# Patient Record
Sex: MALE | Employment: FULL TIME | URBAN - METROPOLITAN AREA
[De-identification: names, ages, dates, MRNs, and addresses within clinical notes are randomized per-mention and may not be internally consistent; named-entity substitution may affect disease eponyms.]

---

## 2022-01-18 ENCOUNTER — HOSPITAL ENCOUNTER (INPATIENT)
Facility: HOSPITAL | Age: 57
LOS: 3 days | Discharge: HOME | DRG: 871 | End: 2022-01-21
Attending: EMERGENCY MEDICINE | Admitting: FAMILY MEDICINE
Payer: COMMERCIAL

## 2022-01-18 ENCOUNTER — APPOINTMENT (INPATIENT)
Dept: RADIOLOGY | Facility: HOSPITAL | Age: 57
DRG: 871 | End: 2022-01-18
Payer: COMMERCIAL

## 2022-01-18 ENCOUNTER — APPOINTMENT (EMERGENCY)
Dept: RADIOLOGY | Facility: HOSPITAL | Age: 57
DRG: 871 | End: 2022-01-18
Payer: COMMERCIAL

## 2022-01-18 DIAGNOSIS — R09.02 HYPOXIA: ICD-10-CM

## 2022-01-18 DIAGNOSIS — J12.82 PNEUMONIA DUE TO COVID-19 VIRUS: ICD-10-CM

## 2022-01-18 DIAGNOSIS — J96.01 ACUTE RESPIRATORY FAILURE WITH HYPOXIA (HCC): ICD-10-CM

## 2022-01-18 DIAGNOSIS — I26.99 ACUTE PULMONARY EMBOLISM WITHOUT ACUTE COR PULMONALE, UNSPECIFIED PULMONARY EMBOLISM TYPE (HCC): ICD-10-CM

## 2022-01-18 DIAGNOSIS — U07.1 COVID: Primary | ICD-10-CM

## 2022-01-18 DIAGNOSIS — U07.1 PNEUMONIA DUE TO COVID-19 VIRUS: ICD-10-CM

## 2022-01-18 PROBLEM — A41.9 SEPSIS (HCC): Status: ACTIVE | Noted: 2022-01-18

## 2022-01-18 PROBLEM — R79.89 ELEVATED SERUM CREATININE: Status: ACTIVE | Noted: 2022-01-18

## 2022-01-18 PROBLEM — E87.6 HYPOKALEMIA: Status: ACTIVE | Noted: 2022-01-18

## 2022-01-18 LAB
2HR DELTA HS TROPONIN: 0 NG/L
ALBUMIN SERPL BCP-MCNC: 2.4 G/DL (ref 3.5–5)
ALP SERPL-CCNC: 101 U/L (ref 46–116)
ALT SERPL W P-5'-P-CCNC: 26 U/L (ref 12–78)
ANION GAP SERPL CALCULATED.3IONS-SCNC: 11 MMOL/L (ref 4–13)
APTT PPP: 36 SECONDS (ref 23–37)
AST SERPL W P-5'-P-CCNC: 36 U/L (ref 5–45)
BASOPHILS # BLD AUTO: 0.01 THOUSANDS/ΜL (ref 0–0.1)
BASOPHILS NFR BLD AUTO: 0 % (ref 0–1)
BILIRUB SERPL-MCNC: 0.81 MG/DL (ref 0.2–1)
BUN SERPL-MCNC: 24 MG/DL (ref 5–25)
CALCIUM ALBUM COR SERPL-MCNC: 9.5 MG/DL (ref 8.3–10.1)
CALCIUM SERPL-MCNC: 8.2 MG/DL (ref 8.3–10.1)
CARDIAC TROPONIN I PNL SERPL HS: 9 NG/L
CARDIAC TROPONIN I PNL SERPL HS: 9 NG/L
CHLORIDE SERPL-SCNC: 98 MMOL/L (ref 100–108)
CO2 SERPL-SCNC: 28 MMOL/L (ref 21–32)
CREAT SERPL-MCNC: 1.38 MG/DL (ref 0.6–1.3)
CRP SERPL QL: 154.4 MG/L
D DIMER PPP FEU-MCNC: >20 UG/ML FEU
EOSINOPHIL # BLD AUTO: 0.03 THOUSAND/ΜL (ref 0–0.61)
EOSINOPHIL NFR BLD AUTO: 0 % (ref 0–6)
ERYTHROCYTE [DISTWIDTH] IN BLOOD BY AUTOMATED COUNT: 12.9 % (ref 11.6–15.1)
FLUAV RNA RESP QL NAA+PROBE: NEGATIVE
FLUBV RNA RESP QL NAA+PROBE: NEGATIVE
GFR SERPL CREATININE-BSD FRML MDRD: 55 ML/MIN/1.73SQ M
GLUCOSE SERPL-MCNC: 143 MG/DL (ref 65–140)
HCT VFR BLD AUTO: 42.6 % (ref 36.5–49.3)
HGB BLD-MCNC: 13.8 G/DL (ref 12–17)
IMM GRANULOCYTES # BLD AUTO: 0.04 THOUSAND/UL (ref 0–0.2)
IMM GRANULOCYTES NFR BLD AUTO: 1 % (ref 0–2)
INR PPP: 1.53 (ref 0.84–1.19)
LYMPHOCYTES # BLD AUTO: 0.65 THOUSANDS/ΜL (ref 0.6–4.47)
LYMPHOCYTES NFR BLD AUTO: 8 % (ref 14–44)
MCH RBC QN AUTO: 27.8 PG (ref 26.8–34.3)
MCHC RBC AUTO-ENTMCNC: 32.4 G/DL (ref 31.4–37.4)
MCV RBC AUTO: 86 FL (ref 82–98)
MONOCYTES # BLD AUTO: 0.54 THOUSAND/ΜL (ref 0.17–1.22)
MONOCYTES NFR BLD AUTO: 7 % (ref 4–12)
NEUTROPHILS # BLD AUTO: 6.57 THOUSANDS/ΜL (ref 1.85–7.62)
NEUTS SEG NFR BLD AUTO: 84 % (ref 43–75)
NRBC BLD AUTO-RTO: 0 /100 WBCS
NT-PROBNP SERPL-MCNC: 151 PG/ML
PLATELET # BLD AUTO: 252 THOUSANDS/UL (ref 149–390)
PMV BLD AUTO: 10 FL (ref 8.9–12.7)
POTASSIUM SERPL-SCNC: 3.4 MMOL/L (ref 3.5–5.3)
PROCALCITONIN SERPL-MCNC: 0.47 NG/ML
PROT SERPL-MCNC: 7.5 G/DL (ref 6.4–8.2)
PROTHROMBIN TIME: 18 SECONDS (ref 11.6–14.5)
RBC # BLD AUTO: 4.97 MILLION/UL (ref 3.88–5.62)
RSV RNA RESP QL NAA+PROBE: NEGATIVE
SARS-COV-2 RNA RESP QL NAA+PROBE: POSITIVE
SODIUM SERPL-SCNC: 137 MMOL/L (ref 136–145)
WBC # BLD AUTO: 7.84 THOUSAND/UL (ref 4.31–10.16)

## 2022-01-18 PROCEDURE — 93005 ELECTROCARDIOGRAM TRACING: CPT

## 2022-01-18 PROCEDURE — 99284 EMERGENCY DEPT VISIT MOD MDM: CPT | Performed by: EMERGENCY MEDICINE

## 2022-01-18 PROCEDURE — 85730 THROMBOPLASTIN TIME PARTIAL: CPT | Performed by: EMERGENCY MEDICINE

## 2022-01-18 PROCEDURE — 84484 ASSAY OF TROPONIN QUANT: CPT | Performed by: EMERGENCY MEDICINE

## 2022-01-18 PROCEDURE — 86140 C-REACTIVE PROTEIN: CPT | Performed by: EMERGENCY MEDICINE

## 2022-01-18 PROCEDURE — 36415 COLL VENOUS BLD VENIPUNCTURE: CPT | Performed by: EMERGENCY MEDICINE

## 2022-01-18 PROCEDURE — 87449 NOS EACH ORGANISM AG IA: CPT | Performed by: FAMILY MEDICINE

## 2022-01-18 PROCEDURE — 71045 X-RAY EXAM CHEST 1 VIEW: CPT

## 2022-01-18 PROCEDURE — 85379 FIBRIN DEGRADATION QUANT: CPT | Performed by: EMERGENCY MEDICINE

## 2022-01-18 PROCEDURE — 80053 COMPREHEN METABOLIC PANEL: CPT | Performed by: EMERGENCY MEDICINE

## 2022-01-18 PROCEDURE — G1004 CDSM NDSC: HCPCS

## 2022-01-18 PROCEDURE — 85025 COMPLETE CBC W/AUTO DIFF WBC: CPT | Performed by: EMERGENCY MEDICINE

## 2022-01-18 PROCEDURE — XW033E5 INTRODUCTION OF REMDESIVIR ANTI-INFECTIVE INTO PERIPHERAL VEIN, PERCUTANEOUS APPROACH, NEW TECHNOLOGY GROUP 5: ICD-10-PCS | Performed by: INTERNAL MEDICINE

## 2022-01-18 PROCEDURE — 85610 PROTHROMBIN TIME: CPT | Performed by: EMERGENCY MEDICINE

## 2022-01-18 PROCEDURE — 99285 EMERGENCY DEPT VISIT HI MDM: CPT

## 2022-01-18 PROCEDURE — 84145 PROCALCITONIN (PCT): CPT | Performed by: EMERGENCY MEDICINE

## 2022-01-18 PROCEDURE — 99223 1ST HOSP IP/OBS HIGH 75: CPT | Performed by: FAMILY MEDICINE

## 2022-01-18 PROCEDURE — 0241U HB NFCT DS VIR RESP RNA 4 TRGT: CPT | Performed by: EMERGENCY MEDICINE

## 2022-01-18 PROCEDURE — 83880 ASSAY OF NATRIURETIC PEPTIDE: CPT | Performed by: EMERGENCY MEDICINE

## 2022-01-18 PROCEDURE — 71275 CT ANGIOGRAPHY CHEST: CPT

## 2022-01-18 RX ORDER — POTASSIUM CHLORIDE 20 MEQ/1
40 TABLET, EXTENDED RELEASE ORAL ONCE
Status: COMPLETED | OUTPATIENT
Start: 2022-01-18 | End: 2022-01-18

## 2022-01-18 RX ORDER — DOXYCYCLINE HYCLATE 100 MG/1
100 CAPSULE ORAL EVERY 12 HOURS SCHEDULED
Status: DISCONTINUED | OUTPATIENT
Start: 2022-01-18 | End: 2022-01-21

## 2022-01-18 RX ORDER — DEXAMETHASONE SODIUM PHOSPHATE 4 MG/ML
6 INJECTION, SOLUTION INTRA-ARTICULAR; INTRALESIONAL; INTRAMUSCULAR; INTRAVENOUS; SOFT TISSUE EVERY 24 HOURS
Status: DISCONTINUED | OUTPATIENT
Start: 2022-01-18 | End: 2022-01-21

## 2022-01-18 RX ORDER — SODIUM CHLORIDE 9 MG/ML
75 INJECTION, SOLUTION INTRAVENOUS CONTINUOUS
Status: DISPENSED | OUTPATIENT
Start: 2022-01-18 | End: 2022-01-19

## 2022-01-18 RX ORDER — CEFTRIAXONE 1 G/50ML
1000 INJECTION, SOLUTION INTRAVENOUS EVERY 24 HOURS
Status: DISCONTINUED | OUTPATIENT
Start: 2022-01-18 | End: 2022-01-21 | Stop reason: HOSPADM

## 2022-01-18 RX ADMIN — REMDESIVIR 200 MG: 100 INJECTION, POWDER, LYOPHILIZED, FOR SOLUTION INTRAVENOUS at 18:41

## 2022-01-18 RX ADMIN — DOXYCYCLINE 100 MG: 100 CAPSULE ORAL at 21:25

## 2022-01-18 RX ADMIN — IOHEXOL 85 ML: 350 INJECTION, SOLUTION INTRAVENOUS at 17:22

## 2022-01-18 RX ADMIN — ENOXAPARIN SODIUM 100 MG: 100 INJECTION SUBCUTANEOUS at 18:44

## 2022-01-18 RX ADMIN — DEXAMETHASONE SODIUM PHOSPHATE 6 MG: 4 INJECTION, SOLUTION INTRA-ARTICULAR; INTRALESIONAL; INTRAMUSCULAR; INTRAVENOUS; SOFT TISSUE at 18:42

## 2022-01-18 RX ADMIN — CEFTRIAXONE 1000 MG: 1 INJECTION, SOLUTION INTRAVENOUS at 17:51

## 2022-01-18 RX ADMIN — SODIUM CHLORIDE 75 ML/HR: 0.9 INJECTION, SOLUTION INTRAVENOUS at 17:50

## 2022-01-18 RX ADMIN — POTASSIUM CHLORIDE 40 MEQ: 1500 TABLET, EXTENDED RELEASE ORAL at 17:53

## 2022-01-18 NOTE — H&P
History and Physical - [facility]    Patient Information: Diana Baldwin 60 y o  male MRN: [id number]  Unit/Bed#: ED 07 Encounter: [id number]  Admitting Physician: Faviola Blevins DO  PCP: No primary care provider on file  Date of Admission:  01/18/22        Hospital Problem List:     Principal Problem:    Pneumonia due to COVID-19 virus  Active Problems:    Acute respiratory failure with hypoxia (HCC)    Elevated serum creatinine    Sepsis (HCC)    Hypokalemia      Assessment/Plan:    * Pneumonia due to COVID-19 virus  Assessment & Plan  Reports symptoms 1 week  Noted to be hypoxic on room air  Will follow Mild disease pathway  · Dexamethasone 6 mg IV daily for 10 days  · Remdesivir 200 mg x 1 followed by 100 mg daily for 4 days  · Anticoagulation-weight based Lovenox b i d  as D-dimer noted to be significantly elevated  CTA chest has been ordered  · Discussed with patient about Baricitinib and initiating it if oxygen requirement worsens  · Trend inflammatory markers, CRP  · Trend D-dimer  · Daily CBC, CMP  · Start doxy and rocephin  Trend procalcitonin  · Patient was educated and encouraged self proning  · Increase activity and mobilization as tolerated    Results from last 7 days   Lab Units 01/18/22  1435   SARS-COV-2  Positive*     Results from last 7 days   Lab Units 01/18/22  1435   CRP mg/L 154 4*   D-DIMER QUANTITATIVE ug/ml FEU >20 00*      Results from last 7 days   Lab Units 01/18/22  1435   PROCALCITONIN ng/ml 0 47*        Acute respiratory failure with hypoxia ([address] )  Assessment & Plan  As noted by hypoxia in the 70s on room air, tachypnea  Titrate off oxygen as tolerated    Elevated serum creatinine  Assessment & Plan  Likele pre renal in nature  No baseline available for comparison  Gentle IV fluids as patient will also be receiving dye for CTA and get repeat lab work in a m      Results from last 7 days   Lab Units 01/18/22  1435   BUN mg/dL 24   CREATININE mg/dL 1 38*       Sepsis [facility])  Assessment & Plan  As noted by tachycardia, tachypnea most likely due to COVID virus  Repeat labs in a m  Hypokalemia  Assessment & Plan  Replete and get repeat lab work in a   VTE Prophylaxis: Enoxaparin (Lovenox)  / sequential compression device   Code Status: DNR/DNI    Anticipated Length of Stay:  Patient will be admitted on an Inpatient basis with an anticipated length of stay of atleast 2 midnights  Justification for Hospital Stay:  Pneumonia due to COVID, acute respiratory failure with hypoxia    Total Time for Visit, including Counseling / Coordination of Care: 1 hour  Greater than 50% of this total time spent on direct patient counseling and coordination of care  Chief Complaint:     Shortness of Breath (COUGH AND sob FOR SEVERAL DAYS  SENT BY CARE NOW FOR O2 SAT OF 77%  CHEST HEAVINESS)    History of Present Illness:    Kaushik Garza is a 60 y o  male who presents with hypoxia  Patient reports COVID symptoms for 1 week  Wife also with COVID  He is not vaccinated and no MAB infusion  Patient reports exertional shortness of breath  He was at PCPs office today and noted to be hypoxic and sent to the ER    Review of Systems:    Review of Systems   Constitutional: Positive for appetite change, chills and fever  HENT: Negative for congestion and trouble swallowing  Eyes: Negative for photophobia and visual disturbance  Respiratory: Positive for cough and shortness of breath  Negative for chest tightness  Cardiovascular: Negative for chest pain and leg swelling  Gastrointestinal: Negative for abdominal pain, diarrhea, nausea and vomiting  Genitourinary: Negative for dysuria, frequency and hematuria  Musculoskeletal: Negative for myalgias  Skin: Negative for wound  Neurological: Negative for dizziness, light-headedness and headaches  Past Medical and Surgical History:     History reviewed  No pertinent past medical history      Past Surgical History: Procedure Laterality Date    TONSILLECTOMY         Meds/Allergies:    PTA meds:   None       Allergies: No Known Allergies  History:     Marital Status: Unknown     Substance Use History:   Social History     Substance and Sexual Activity   Alcohol Use Never     Social History     Tobacco Use   Smoking Status Never Smoker   Smokeless Tobacco Current User    Types: Chew     Social History     Substance and Sexual Activity   Drug Use Never       Family History:    History reviewed  No pertinent family history  Physical Exam:     Vitals:   Blood Pressure: 152/80 (01/18/22 1413)  Pulse: (!) 120 (01/18/22 1413)  Temperature: 98 3 °F (36 8 °C) (01/18/22 1414)  Respirations: (!) 26 (01/18/22 1413)  Weight - Scale: 98 kg (216 lb 0 8 oz) (01/18/22 1413)  SpO2: (!) 74 % (01/18/22 1413)    Physical Exam  Constitutional:       Comments: Mild conversational dyspnea noted although patient able to speak in full sentences   HENT:      Head: Normocephalic and atraumatic  Eyes:      Extraocular Movements: Extraocular movements intact  Cardiovascular:      Rate and Rhythm: Regular rhythm  Tachycardia present  Pulmonary:      Effort: Pulmonary effort is normal  No respiratory distress  Breath sounds: No wheezing or rales  Comments: Decreased breath sounds bilaterally  Abdominal:      General: Bowel sounds are normal  There is no distension  Palpations: Abdomen is soft  Tenderness: There is no abdominal tenderness  There is no guarding  Musculoskeletal:      Right lower leg: No edema  Left lower leg: No edema  Neurological:      Mental Status: He is alert and oriented to person, place, and time  Lab Results: I have personally reviewed pertinent reports        Results from last 7 days   Lab Units 01/18/22  1435   WBC Thousand/uL 7 84   HEMOGLOBIN g/dL 13 8   HEMATOCRIT % 42 6   PLATELETS Thousands/uL 252   NEUTROS PCT % 84*   LYMPHS PCT % 8*   MONOS PCT % 7   EOS PCT % 0 Results from last 7 days   Lab Units 01/18/22  1435   POTASSIUM mmol/L 3 4*   CHLORIDE mmol/L 98*   CO2 mmol/L 28   BUN mg/dL 24   CREATININE mg/dL 1 38*   CALCIUM mg/dL 8 2*   ALK PHOS U/L 101   ALT U/L 26   AST U/L 36     Results from last 7 days   Lab Units 01/18/22  1435   INR  1 53*       Imaging: I have personally reviewed pertinent reports  No results found  XR chest 1 view portable    (Results Pending)   CTA ED chest PE study    (Results Pending)       EKG, Pathology, and Other Studies Reviewed on Admission:   · EKg pending    Allscripts/EPIC Records Reviewed: Yes     ** Please Note: "This note has been constructed using a voice recognition system  Therefore there may be syntax, spelling, and/or grammatical errors   Please call if you have any questions  "**

## 2022-01-18 NOTE — ED PROVIDER NOTES
History  Chief Complaint   Patient presents with    Shortness of Breath     COUGH AND sob FOR SEVERAL DAYS  SENT BY CARE NOW FOR O2 SAT OF 77%  CHEST HEAVINESS     Patient is unvaccinated to both flu and COVID  States he and his wife have been sick for [place] couple a days , by which he means about a week  She tested positive for COVID on Friday 5 days ago  Patient states he was feeling ill but got much worse over the last day or 2 with worse trouble breathing  Patient was seen at a doctor's office today noted to be severely hypoxic, as well as tachycardic and sent to the emergency room for further evaluation  Patient states he has not been to the doctor, does not get medical care routinely ever          None       History reviewed  No pertinent past medical history  Past Surgical History:   Procedure Laterality Date    TONSILLECTOMY         History reviewed  No pertinent family history  I have reviewed and agree with the history as documented  E-Cigarette/Vaping    E-Cigarette Use Never User      E-Cigarette/Vaping Substances    Nicotine No     THC No     CBD No     Flavoring No     Other No     Unknown No      Social History     Tobacco Use    Smoking status: Never Smoker    Smokeless tobacco: Current User     Types: Chew   Vaping Use    Vaping Use: Never used   Substance Use Topics    Alcohol use: Never    Drug use: Never       Review of Systems   Constitutional: Positive for chills, diaphoresis and fever  HENT: Positive for congestion  Negative for sore throat  Eyes: Negative for visual disturbance  Respiratory: Positive for cough, chest tightness and shortness of breath  Cardiovascular: Negative for chest pain and leg swelling  Gastrointestinal: Negative for abdominal pain and vomiting  Genitourinary: Negative for decreased urine volume and dysuria  Musculoskeletal: Positive for arthralgias and myalgias  Negative for back pain  Skin: Negative for color change and rash  Neurological: Positive for weakness, light-headedness and headaches  Hematological: Does not bruise/bleed easily  Psychiatric/Behavioral: Negative for confusion  All other systems reviewed and are negative  Physical Exam  Physical Exam  Vitals and nursing note reviewed  Constitutional:       Appearance: He is obese  HENT:      Head: Normocephalic  Mouth/Throat:      Mouth: Mucous membranes are moist    Eyes:      Extraocular Movements: Extraocular movements intact  Pupils: Pupils are equal, round, and reactive to light  Cardiovascular:      Rate and Rhythm: Regular rhythm  Tachycardia present  Pulmonary:      Effort: Pulmonary effort is normal       Breath sounds: Decreased breath sounds present  No wheezing  Chest:      Chest wall: No tenderness  Abdominal:      General: Bowel sounds are normal       Palpations: Abdomen is soft  Musculoskeletal:         General: Normal range of motion  Cervical back: Normal range of motion and neck supple  Right lower leg: No tenderness  No edema  Left lower leg: No tenderness  No edema  Skin:     General: Skin is warm and dry  Capillary Refill: Capillary refill takes less than 2 seconds  Neurological:      General: No focal deficit present  Mental Status: He is alert     Psychiatric:         Mood and Affect: Mood normal          Behavior: Behavior normal          Vital Signs  ED Triage Vitals   Temperature Pulse Respirations Blood Pressure SpO2   01/18/22 1414 01/18/22 1413 01/18/22 1413 01/18/22 1413 01/18/22 1413   98 3 °F (36 8 °C) (!) 120 (!) 26 152/80 (!) 74 %      Temp src Heart Rate Source Patient Position - Orthostatic VS BP Location FiO2 (%)   -- -- -- -- --             Pain Score       01/18/22 1413       2           Vitals:    01/18/22 1413   BP: 152/80   Pulse: (!) 120         Visual Acuity      ED Medications  Medications - No data to display    Diagnostic Studies  Results Reviewed     Procedure Component Value Units Date/Time    HS Troponin I 4hr [[id number]]     Lab Status: No result Specimen: Blood     D-Dimer [[id number]]  (Abnormal) Collected: 01/18/22 1435    Lab Status: Final result Specimen: Blood from Arm, Right Updated: 01/18/22 1535     D-Dimer, Quant >20 00 ug/ml FEU     NT-BNP PRO [[id number]]  (Abnormal) Collected: 01/18/22 1435    Lab Status: Final result Specimen: Blood from Arm, Right Updated: 01/18/22 1528     NT-proBNP 151 pg/mL     C-reactive protein [[id number]]  (Abnormal) Collected: 01/18/22 1435    Lab Status: Final result Specimen: Blood from Arm, Right Updated: 01/18/22 1528      4 mg/L     Procalcitonin with AM Reflex [[id number]]  (Abnormal) Collected: 01/18/22 1435    Lab Status: Final result Specimen: Blood from Arm, Right Updated: 01/18/22 1521     Procalcitonin 0 47 ng/ml     Procalcitonin Reflex [[id number]]     Lab Status: No result Specimen: Blood     COVID/FLU/RSV - 2 hour TAT [[id number]]  (Abnormal) Collected: 01/18/22 1435    Lab Status: Final result Specimen: Nares from Nose Updated: 01/18/22 1519     SARS-CoV-2 Positive     INFLUENZA A PCR Negative     INFLUENZA B PCR Negative     RSV PCR Negative    Narrative:      FOR PEDIATRIC PATIENTS - copy/paste COVID Guidelines URL to browser: [email or web address]    SARS-CoV-2 assay is a Nucleic Acid Amplification assay intended for the  qualitative detection of nucleic acid from SARS-CoV-2 in nasopharyngeal  swabs  Results are for the presumptive identification of SARS-CoV-2 RNA  Positive results are indicative of infection with SARS-CoV-2, the virus  causing COVID-19, but do not rule out bacterial infection or co-infection  with other viruses  Laboratories within the [place] and its  territories are required to report all positive results to the appropriate  public health authorities   Negative results do not preclude SARS-CoV-2  infection and should not be used as the sole basis for treatment or other  patient management decisions  Negative results must be combined with  clinical observations, patient history, and epidemiological information  This test has not been FDA cleared or approved  This test has been authorized by FDA under an Emergency Use Authorization  (EUA)  This test is only authorized for the duration of time the  declaration that circumstances exist justifying the authorization of the  emergency use of an in vitro diagnostic tests for detection of SARS-CoV-2  virus and/or diagnosis of COVID-19 infection under section 564(b)(1) of  the Act, 21 U  S C  650FPJ-3(O)(1), unless the authorization is terminated  or revoked sooner  The test has been validated but independent review by FDA  and CLIA is pending  Test performed using [employer] GeneXpert: This RT-PCR assay targets N2,  a region unique to SARS-CoV-2  A conserved region in the E-gene was chosen  for pan-Sarbecovirus detection which includes SARS-CoV-2      Comprehensive metabolic panel [[id number]]  (Abnormal) Collected: 01/18/22 1435    Lab Status: Final result Specimen: Blood from Arm, Right Updated: 01/18/22 1511     Sodium 137 mmol/L      Potassium 3 4 mmol/L      Chloride 98 mmol/L      CO2 28 mmol/L      ANION GAP 11 mmol/L      BUN 24 mg/dL      Creatinine 1 38 mg/dL      Glucose 143 mg/dL      Calcium 8 2 mg/dL      Corrected Calcium 9 5 mg/dL      AST 36 U/L      ALT 26 U/L      Alkaline Phosphatase 101 U/L      Total Protein 7 5 g/dL      Albumin 2 4 g/dL      Total Bilirubin 0 81 mg/dL      eGFR 55 ml/min/1 73sq m     Narrative:      Jose guidelines for Chronic Kidney Disease (CKD):     Stage 1 with normal or high GFR (GFR > 90 mL/min/1 73 square meters)    Stage 2 Mild CKD (GFR = 60-89 mL/min/1 73 square meters)    Stage 3A Moderate CKD (GFR = 45-59 mL/min/1 73 square meters)    Stage 3B Moderate CKD (GFR = 30-44 mL/min/1 73 square meters)    Stage 4 Severe CKD (GFR = 15-29 mL/min/1 73 square meters)    Stage 5 End Stage CKD (GFR <15 mL/min/1 73 square meters)  Note: GFR calculation is accurate only with a steady state creatinine    HS Troponin 0hr (reflex protocol) [[id number]]  (Normal) Collected: 01/18/22 1435    Lab Status: Final result Specimen: Blood from Arm, Right Updated: 01/18/22 1507     hs TnI 0hr 9 ng/L     HS Troponin I 2hr [[id number]]     Lab Status: No result Specimen: Blood     Protime-INR [[id number]]  (Abnormal) Collected: 01/18/22 1435    Lab Status: Final result Specimen: Blood from Arm, Right Updated: 01/18/22 1505     Protime 18 0 seconds      INR 1 53    APTT [[id number]]  (Normal) Collected: 01/18/22 1435    Lab Status: Final result Specimen: Blood from Arm, Right Updated: 01/18/22 1505     PTT 36 seconds     CBC and differential [[id number]]  (Abnormal) Collected: 01/18/22 1435    Lab Status: Final result Specimen: Blood from Arm, Right Updated: 01/18/22 1441     WBC 7 84 Thousand/uL      RBC 4 97 Million/uL      Hemoglobin 13 8 g/dL      Hematocrit 42 6 %      MCV 86 fL      MCH 27 8 pg      MCHC 32 4 g/dL      RDW 12 9 %      MPV 10 0 fL      Platelets 378 Thousands/uL      nRBC 0 /100 WBCs      Neutrophils Relative 84 %      Immat GRANS % 1 %      Lymphocytes Relative 8 %      Monocytes Relative 7 %      Eosinophils Relative 0 %      Basophils Relative 0 %      Neutrophils Absolute 6 57 Thousands/µL      Immature Grans Absolute 0 04 Thousand/uL      Lymphocytes Absolute 0 65 Thousands/µL      Monocytes Absolute 0 54 Thousand/µL      Eosinophils Absolute 0 03 Thousand/µL      Basophils Absolute 0 01 Thousands/µL                  XR chest 1 view portable   Final Result by Lucho Evans MD (01/18 1636)      Multifocal pneumonia in this patient with confirmed COVID 19                    Workstation performed: [id number]         CTA ED chest PE study    (Results Pending)              Procedures  ECG 12 Lead Documentation Only    Date/Time: 1/18/2022 2:13 PM  Performed by: Vandana Ochoa MD  Authorized by: Vandana Ochoa MD     Indications / Diagnosis:  COVID  ECG reviewed by me, the ED Provider: yes    Patient location:  ED  Interpretation:     Interpretation: abnormal    Rate:     ECG rate:  111    ECG rate assessment: tachycardic    Rhythm:     Rhythm: sinus tachycardia    Ectopy:     Ectopy: none    QRS:     QRS axis:  Normal    QRS intervals:  Normal  Conduction:     Conduction: normal    ST segments:     ST segments:  Non-specific  T waves:     T waves: normal               ED Course                                             MDM  Number of Diagnoses or Management Options  Diagnosis management comments: Patient has flu-like symptom complex associated with hypoxia near the 6th or 7th day of infection  Very likely COVID  Disposition  Final diagnoses:   Hypoxia   COVID     Time reflects when diagnosis was documented in both MDM as applicable and the Disposition within this note     Time User Action Codes Description Comment    1/18/2022  4:35 PM Rayne Stone Add [R09 02] Hypoxia     1/18/2022  4:35 PM Rayne Stone Add [U07 1] COVID       ED Disposition     ED Disposition Condition Date/Time Comment    Admit Stable Tue Jan 18, 2022  4:35 PM Case was discussed with hospitalist and the patient's admission status was agreed to be Admission Status: inpatient status to the service of Dr Zack Perez   Follow-up Information    None         Patient's Medications    No medications on file       No discharge procedures on file      PDMP Review     None          ED Provider  Electronically Signed by           Vandana Ochoa MD  01/18/22 [id number]       Vandana Ochoa MD  01/22/22 [id number]

## 2022-01-19 PROBLEM — E87.6 HYPOKALEMIA: Status: RESOLVED | Noted: 2022-01-18 | Resolved: 2022-01-19

## 2022-01-19 PROBLEM — I26.99 ACUTE PULMONARY EMBOLISM (HCC): Status: ACTIVE | Noted: 2022-01-19

## 2022-01-19 PROBLEM — R79.89 ELEVATED SERUM CREATININE: Status: RESOLVED | Noted: 2022-01-18 | Resolved: 2022-01-19

## 2022-01-19 LAB
ALBUMIN SERPL BCP-MCNC: 2 G/DL (ref 3.5–5)
ALP SERPL-CCNC: 88 U/L (ref 46–116)
ALT SERPL W P-5'-P-CCNC: 18 U/L (ref 12–78)
ANION GAP SERPL CALCULATED.3IONS-SCNC: 11 MMOL/L (ref 4–13)
AST SERPL W P-5'-P-CCNC: 29 U/L (ref 5–45)
BILIRUB SERPL-MCNC: 0.51 MG/DL (ref 0.2–1)
BUN SERPL-MCNC: 21 MG/DL (ref 5–25)
CALCIUM ALBUM COR SERPL-MCNC: 9.3 MG/DL (ref 8.3–10.1)
CALCIUM SERPL-MCNC: 7.7 MG/DL (ref 8.3–10.1)
CHLORIDE SERPL-SCNC: 104 MMOL/L (ref 100–108)
CO2 SERPL-SCNC: 23 MMOL/L (ref 21–32)
CREAT SERPL-MCNC: 1.05 MG/DL (ref 0.6–1.3)
CRP SERPL QL: 133.9 MG/L
D DIMER PPP FEU-MCNC: >20 UG/ML FEU
ERYTHROCYTE [DISTWIDTH] IN BLOOD BY AUTOMATED COUNT: 12.9 % (ref 11.6–15.1)
GFR SERPL CREATININE-BSD FRML MDRD: 77 ML/MIN/1.73SQ M
GLUCOSE SERPL-MCNC: 123 MG/DL (ref 65–140)
HCT VFR BLD AUTO: 38.4 % (ref 36.5–49.3)
HGB BLD-MCNC: 12.4 G/DL (ref 12–17)
L PNEUMO1 AG UR QL IA.RAPID: NEGATIVE
MAGNESIUM SERPL-MCNC: 2.5 MG/DL (ref 1.6–2.6)
MCH RBC QN AUTO: 27.9 PG (ref 26.8–34.3)
MCHC RBC AUTO-ENTMCNC: 32.3 G/DL (ref 31.4–37.4)
MCV RBC AUTO: 87 FL (ref 82–98)
PLATELET # BLD AUTO: 245 THOUSANDS/UL (ref 149–390)
PMV BLD AUTO: 10 FL (ref 8.9–12.7)
POTASSIUM SERPL-SCNC: 4.1 MMOL/L (ref 3.5–5.3)
PROT SERPL-MCNC: 6.5 G/DL (ref 6.4–8.2)
RBC # BLD AUTO: 4.44 MILLION/UL (ref 3.88–5.62)
S PNEUM AG UR QL: NEGATIVE
SODIUM SERPL-SCNC: 138 MMOL/L (ref 136–145)
WBC # BLD AUTO: 5.01 THOUSAND/UL (ref 4.31–10.16)

## 2022-01-19 PROCEDURE — 99232 SBSQ HOSP IP/OBS MODERATE 35: CPT | Performed by: FAMILY MEDICINE

## 2022-01-19 PROCEDURE — 86140 C-REACTIVE PROTEIN: CPT | Performed by: FAMILY MEDICINE

## 2022-01-19 PROCEDURE — 80053 COMPREHEN METABOLIC PANEL: CPT | Performed by: FAMILY MEDICINE

## 2022-01-19 PROCEDURE — 83735 ASSAY OF MAGNESIUM: CPT | Performed by: FAMILY MEDICINE

## 2022-01-19 PROCEDURE — 85379 FIBRIN DEGRADATION QUANT: CPT | Performed by: FAMILY MEDICINE

## 2022-01-19 PROCEDURE — 85027 COMPLETE CBC AUTOMATED: CPT | Performed by: FAMILY MEDICINE

## 2022-01-19 RX ADMIN — ENOXAPARIN SODIUM 100 MG: 100 INJECTION SUBCUTANEOUS at 08:52

## 2022-01-19 RX ADMIN — REMDESIVIR 100 MG: 100 INJECTION, POWDER, LYOPHILIZED, FOR SOLUTION INTRAVENOUS at 18:46

## 2022-01-19 RX ADMIN — DOXYCYCLINE 100 MG: 100 CAPSULE ORAL at 08:52

## 2022-01-19 RX ADMIN — ENOXAPARIN SODIUM 100 MG: 100 INJECTION SUBCUTANEOUS at 21:09

## 2022-01-19 RX ADMIN — DEXAMETHASONE SODIUM PHOSPHATE 6 MG: 4 INJECTION, SOLUTION INTRA-ARTICULAR; INTRALESIONAL; INTRAMUSCULAR; INTRAVENOUS; SOFT TISSUE at 17:34

## 2022-01-19 RX ADMIN — DOXYCYCLINE 100 MG: 100 CAPSULE ORAL at 21:09

## 2022-01-19 RX ADMIN — CEFTRIAXONE 1000 MG: 1 INJECTION, SOLUTION INTRAVENOUS at 17:35

## 2022-01-19 NOTE — ASSESSMENT & PLAN NOTE
Likele pre renal in nature  No baseline available for comparison  Received timed Gentle IV fluids as patient also received dye for CTA   get repeat lab work in a m      Results from last 7 days   Lab Units 01/19/22  0458 01/18/22  1435   BUN mg/dL 21 24   CREATININE mg/dL 1 05 1 38*

## 2022-01-19 NOTE — UTILIZATION REVIEW
Initial Clinical Review    Admission: Date/Time/Statement:   Admission Orders (From admission, onward)     Ordered        01/18/22 1637  Inpatient Admission  Once                      Orders Placed This Encounter   Procedures    Inpatient Admission     Standing Status:   Standing     Number of Occurrences:   1     Order Specific Question:   Level of Care     Answer:   Med Surg [16]     Order Specific Question:   Estimated length of stay     Answer:   More than 2 Midnights     Order Specific Question:   Certification     Answer:   I certify that inpatient services are medically necessary for this patient for a duration of greater than two midnights  See H&P and MD Progress Notes for additional information about the patient's course of treatment  ED Arrival Information     Expected Arrival Acuity    - 1/18/2022 14:03 Urgent         Means of arrival Escorted by Service Admission type    VALENTINA SANCHEZ  [facility] Hospitalist Urgent         Arrival complaint    low oxygen        Chief Complaint   Patient presents with    Shortness of Breath     COUGH AND sob FOR SEVERAL DAYS  SENT BY CARE NOW FOR O2 SAT OF 77%  CHEST HEAVINESS     Initial Presentation:   61y Male to ED presents with hypoxia and shortness of breath on exertion  C/o COVID symptoms x1 wk with wife positive for COVID  Pt is unvaccinated and no MAB infusion  Seen in PCP's office today, noted to be hypoxic and sent to ED  Admit Inpatient level of care for Sepsis, Pneumonia due to COVID-19 virus, Acute respiratory failure with hypoxia, Elevated creatinine and Hypokalemia  Tachycardia and tachypnea  Start Iv Steriod x 10 days and Iv Remdesivir 200 mg x 1 followed by 100 mg daily for 4 days  D-Dimer significantly elevated, start anticoagulation with Lovenox bid  CTA chest ordered  Trend inflammatory markers, CRP  Trend D-dimer  Start Iv antibiotics, elevated Procalcitonin and trend  Currently on O2 2L NC  O2 sat 70s on room air  Titrate off O2 as tolerated   Creat 1 38 on admit  No baseline available  IVFs  On exam; Tachycardia  Decreased breath sounds bilaterally  Mild conversational dyspnea noted although patient able to speak in full sentences  Date: 1/19   Day 2:   Progress notes; Continues on Iv Steriod, Iv Remdesivir and Iv antibiotics  Continue Lovenox for elevated D-dimer  Daily CBC, CMP  Continue to trend inflammatory markers  Currently on O2 2L NC  Titrate off O2 as tolerated  F/u bld cultures  On exam; decreased breath sounds bilaterally  ED Triage Vitals   Temperature Pulse Respirations Blood Pressure SpO2   01/18/22 1414 01/18/22 1413 01/18/22 1413 01/18/22 1413 01/18/22 1413   98 3 °F (36 8 °C) (!) 120 (!) 26 152/80 (!) 74 %      Temp Source Heart Rate Source Patient Position - Orthostatic VS BP Location FiO2 (%)   01/18/22 2318 01/18/22 1930 01/18/22 1945 01/18/22 1945 --   Oral Monitor Lying Right arm       Pain Score       01/18/22 1413       2          Wt Readings from Last 1 Encounters:   01/19/22 98 kg (216 lb)     Additional Vital Signs:   01/19/22 0400 98 4 °F (36 9 °C) 89 22 134/78 100 93 % 28 2 L/min Nasal cannula Lying   01/18/22 2318 97 5 °F (36 4 °C) 97 20 149/84 -- 93 % 28 2 L/min Nasal cannula Lying   01/18/22 2130 -- 96 28  Abnormal  -- -- 93 % 28 2 L/min Nasal cannula --   01/18/22 1945 -- 98 21 136/78 99 93 % 28 2 L/min Nasal cannula Lying   01/18/22 1930 -- 96 25  Abnormal  136/78 -- 93 % 28 2 L/min Nasal cannula      Pertinent Labs/Diagnostic Test Results:   1/18  PCXR - Multifocal pneumonia in this patient with confirmed COVID 19  CTA ed chest pe study - Multifocal pneumonia likely Covid 19 related  Right upper lobe segmental and subsegmental pulmonary emboli   Normal RV/LV ratio      Results from last 7 days   Lab Units 01/18/22  1435   SARS-COV-2  Positive*     Results from last 7 days   Lab Units 01/19/22  0458 01/18/22  1435   WBC Thousand/uL 5 01 7 84   HEMOGLOBIN g/dL 12 4 13 8   HEMATOCRIT % 38 4 42 6   PLATELETS Thousands/uL 245 252   NEUTROS ABS Thousands/µL  --  6 57         Results from last 7 days   Lab Units 01/19/22  0458 01/18/22  1435   SODIUM mmol/L 138 137   POTASSIUM mmol/L 4 1 3 4*   CHLORIDE mmol/L 104 98*   CO2 mmol/L 23 28   ANION GAP mmol/L 11 11   BUN mg/dL 21 24   CREATININE mg/dL 1 05 1 38*   EGFR ml/min/1 73sq m 77 55   CALCIUM mg/dL 7 7* 8 2*   MAGNESIUM mg/dL 2 5  --      Results from last 7 days   Lab Units 01/19/22  0458 01/18/22  1435   AST U/L 29 36   ALT U/L 18 26   ALK PHOS U/L 88 101   TOTAL PROTEIN g/dL 6 5 7 5   ALBUMIN g/dL 2 0* 2 4*   TOTAL BILIRUBIN mg/dL 0 51 0 81         Results from last 7 days   Lab Units 01/19/22  0458 01/18/22  1435   GLUCOSE RANDOM mg/dL 123 143*       Results from last 7 days   Lab Units 01/18/22  1657 01/18/22  1435   HS TNI 0HR ng/L  --  9   HS TNI 2HR ng/L 9  --    HSTNI D2 ng/L 0  --      Results from last 7 days   Lab Units 01/19/22  0458 01/18/22  1435   D-DIMER QUANTITATIVE ug/ml FEU >20 00* >20 00*     Results from last 7 days   Lab Units 01/18/22  1435   PROTIME seconds 18 0*   INR  1 53*   PTT seconds 36         Results from last 7 days   Lab Units 01/18/22  1435   PROCALCITONIN ng/ml 0 47*       Results from last 7 days   Lab Units 01/18/22  1435   NT-PRO BNP pg/mL 151*       Results from last 7 days   Lab Units 01/19/22  0458 01/18/22  1435   CRP mg/L 133 9* 154 4*       Results from last 7 days   Lab Units 01/18/22  1435   INFLUENZA A PCR  Negative   INFLUENZA B PCR  Negative   RSV PCR  Negative       ED Treatment:   Medication Administration from 01/18/2022 1403 to 01/18/2022 2303       Date/Time Order Dose Route Action     01/18/2022 1842 dexamethasone (DECADRON) injection 6 mg 6 mg Intravenous Given     01/18/2022 1844 enoxaparin (LOVENOX) subcutaneous injection 100 mg 100 mg Subcutaneous Given     01/18/2022 1841 remdesivir (Veklury) 200 mg in sodium chloride 0 9 % 290 mL IVPB 200 mg Intravenous Given     01/18/2022 1751 cefTRIAXone (ROCEPHIN) IVPB (premix in dextrose) 1,000 mg 50 mL 1,000 mg Intravenous New Bag     01/18/2022 2125 doxycycline hyclate (VIBRAMYCIN) capsule 100 mg 100 mg Oral Given     01/18/2022 1753 potassium chloride (K-DUR,KLOR-CON) CR tablet 40 mEq 40 mEq Oral Given     01/18/2022 1750 sodium chloride 0 9 % infusion 75 mL/hr Intravenous New Bag     01/18/2022 1722 iohexol (OMNIPAQUE) 350 MG/ML injection (SINGLE-DOSE) 85 mL 85 mL Intravenous Given        History reviewed  No pertinent past medical history  Present on Admission:   Sepsis ([facility] )   Pneumonia due to COVID-19 virus   Acute respiratory failure with hypoxia (HCC)   (Resolved) Elevated serum creatinine   (Resolved) Hypokalemia   Acute pulmonary embolism (HCC)      Admitting Diagnosis: Hypoxia [R09 02]  COVID [U07 1]  Age/Sex: 60 y o  male     Admission Orders:  Scheduled Medications:  cefTRIAXone, 1,000 mg, Intravenous, Q24H  dexamethasone, 6 mg, Intravenous, Q24H  doxycycline hyclate, 100 mg, Oral, Q12H CASTRO  enoxaparin, 1 mg/kg, Subcutaneous, Q12H [address]  nicotine, 1 patch, Transdermal, Daily  remdesivir, 100 mg, Intravenous, Q24H      Continuous IV Infusions:    sodium chloride 0 9 % infusion  Rate: 75 mL/hr Dose: 75 mL/hr  Freq: Continuous Route: IV  Last Dose: Stopped (01/19/22 0453)  Start: 01/18/22 1700 End: 01/19/22 0349      PRN Meds: None       None    Network Utilization Review Department  ATTENTION: Please call with any questions or concerns to [phone] and carefully listen to the prompts so that you are directed to the right person  All voicemails are confidential   Denis Cain all requests for admission clinical reviews, approved or denied determinations and any other requests to dedicated fax number below belonging to the campus where the patient is receiving treatment   List of dedicated fax numbers for the Facilities:  FACILITY NAME UR FAX NUMBER   ADMISSION DENIALS (Administrative/Medical Necessity) [phone]   [address] (Maternity/NICU/Pediatrics) [address] [facility],7Th Floor [address]  [phone]   [address] [facility] [place] [address] [id number] [place] [address] [address] [facility] [phone]

## 2022-01-19 NOTE — CASE MANAGEMENT
Case Management Assessment & Discharge Planning Note    Patient name Servando Machado  Location [address] MRN [id number]  : 1965 Date 2022       Current Admission Date: 2022  Current Admission Diagnosis:Pneumonia due to COVID-19 virus   Patient Active Problem List    Diagnosis Date Noted    Acute pulmonary embolism ([facility] ) 2022    Sepsis ([facility] ) 2022    Pneumonia due to COVID-19 virus 2022    Acute respiratory failure with hypoxia ([facility] ) 2022      LOS (days): 1  Geometric Mean LOS (GMLOS) (days):   Days to GMLOS:     OBJECTIVE:    Risk of Unplanned Readmission Score: 10      Current admission status: Inpatient    Preferred Pharmacy:   [address] [facility],[address], [address] [place] [id number]  Phone: [phone] Fax: [phone]    Primary Care Provider: No primary care provider on file  Primary Insurance: BLUE CROSS  Secondary Insurance:     ASSESSMENT:  Active Health Care Agents    There are no active Health Care Agents on file  Patient [facility] of Residence: [address] [place] [address] do you live in?: Radha Sparks 45 entry access options  Select all that apply : Stairs  Number of steps to enter home : 4  Type of Current Residence: [address] [place] [address]  Upon entering residence, is there a bedroom on the main floor (no further steps)?: No  Upon entering residence, is there a bathroom on the main floor (no further steps)?: No (Half bath only;  Shower stall upstairs)  Indicate which floors of current residence have a bathroom (select all the apply):: 2nd Floor  Number of steps to 2nd floor from main floor: One Flight  In the last 12 months, was there a time when you were not able to pay the mortgage or rent on time?: No  In the last 12 months, how many places have you lived?: 1  In the last 12 months, was there a time when you did not have a steady place to sleep or slept in a shelter (including now)?: No  Homeless/housing insecurity resource given?: N/A  Living Arrangements: Lives w/ Spouse/significant other  Is patient a ?: No    Activities of Daily Living Prior to Admission  Functional Status: Independent  Completes ADLs independently?: Yes  Ambulates independently?: Yes  Does patient use assisted devices?: No  Does patient currently own DME?: No  Does patient have a history of Outpatient Therapy (PT/OT)?: No  Does the patient have a history of Short-Term Rehab?: No  Does patient have a history of HHC?: No  Does patient currently have [employer] ?: No     Patient Information Continued  Income Source: Employed ([employer])  Does patient have prescription coverage?: Yes  Within the past 12 months, you worried that your food would run out before you got the money to buy more : Never true  Within the past 12 months, the food you bought just didnt last and you didnt have money to get more : Never true  Food insecurity resource given?: N/A  Does patient receive dialysis treatments?: No  Does patient have a history of substance abuse?: No  Does patient have a history of Mental Health Diagnosis?: No     Means of Transportation  Means of Transport to Appts[de-identified] Drives Self  In the past 12 months, has lack of transportation kept you from medical appointments or from getting medications?: No  In the past 12 months, has lack of transportation kept you from meetings, work, or from getting things needed for daily living?: No  Was application for public transport provided?: N/A    DISCHARGE DETAILS:    Discharge planning discussed with[de-identified] Patient, Spouse     CM contacted family/caregiver?: Yes  Were Treatment Team discharge recommendations reviewed with patient/caregiver?: Yes  Did patient/caregiver verbalize understanding of patient care needs?: Yes  Were patient/caregiver advised of the risks associated with not following Treatment Team discharge recommendations?: Yes    Contacts  Patient Contacts: Juliette Gamboa Linda  Relationship to Patient[de-identified] Family  Contact Method: Phone  Phone Number: [phone]  Reason/Outcome: Continuity of Care,Emergency Contact,Discharge Planning    Treatment Team Recommendation: Home     Transport at Discharge : Family       BULMARO contacted pt's spouse Agustin Rg to introduce role, complete assessment, and discuss discharge planning needs  Agustin Diez states that prior to admission, pt was very independent with ADLs and was working FT in a  plant  Pt will need a letter with hospitalization dates and return to work date on discharge  BULMARO advised that pending progress, pt may need to discharge home with home oxygen  Agustin Diez advised understanding  Family will transport home on discharge  No other questions or concerns from family at this time  BULMARO will continue to follow for discharge planning needs

## 2022-01-19 NOTE — ASSESSMENT & PLAN NOTE
Reports symptoms 1 week  Noted to be hypoxic on room air , noted to have significantly elevated D-dimer and CRP on admission  Continued on treatment as per Mild disease pathway with improvement in symptoms  · Received Dexamethasone 6 mg IV daily, will transition to dexamethasone taper on discharge  · Received Remdesivir 200 mg x 1 followed by 100 mg daily during hospitalization  · Anticoagulation-weight based Lovenox b i d  For confirmed pulmonary embolism on CT  D-dimer noted to be significantly elevated on admission  Now downtrending  Transition to Eliquis on discharge  Coverage verified       · Trend inflammatory markers, CRP-downtrending  · Urine Legionella, pneumo antigen negative  · Daily CBC, CMP-remains stable  · Continue doxy and rocephin  procalcitonin is mildly elevated, downtrending  Will transition to Ceftin to complete 5 days  · Patient was educated and encouraged self proning, incentive spirometry  · Educated about the activity and mobilization as tolerated  · Home oxygen evaluation was done  patient requires 3 L of supplemental oxygen on discharge at rest and with activity  · Continue symptomatic treatment  · Discussed regarding COVID treatment, follow-up and precautions after discharge  · Recommend to follow-up with PCP and Pulmonary after discharge      Results from last 7 days   Lab Units 01/18/22  1435   SARS-COV-2  Positive*     Results from last 7 days   Lab Units 01/21/22  0603 01/20/22  0508 01/19/22  0458 01/18/22  1435   CRP mg/L 48 1* 80 4* 133 9* 154 4*   D-DIMER QUANTITATIVE ug/ml FEU 3 86*  --  >20 00* >20 00*      Results from last 7 days   Lab Units 01/21/22  0603 01/20/22  0508 01/18/22  1435   PROCALCITONIN ng/ml 0 35* 0 36* 0 47*

## 2022-01-19 NOTE — ASSESSMENT & PLAN NOTE
As noted by tachycardia, tachypnea most likely due to COVID pneumonia with possible superimposed bacterial infection, PE  Tachycardia and tachypnea improved  Remains afebrile    Normal white count  Procalcitonin downtrended

## 2022-01-19 NOTE — ASSESSMENT & PLAN NOTE
Patient noted to be hypoxic, tachycardic with significantly elevated D-dimer  CTA shows right upper lobe segmental and subsegmental PE with normal RV/LV ratio  Cardiac marker negative  ProBNP less than 200  Tachycardia improved  Oxygen requirement stable  · Patient was treated with weight based Lovenox and will transition to NOAC on discharge, discussed anticoagulation options with patient  Patient prefers [employer] coverage verified    · Patient will be discharged on Eliquis on discharge  · Follow-up with PCP and Pulmonary as outpatient

## 2022-01-19 NOTE — PROGRESS NOTES
[facility] Internal Medicine Progress Note  Patient: Fabian Paulino 60 y o  male   MRN: [id number]  PCP: No primary care provider on file  Unit/Bed#: [address] [place] [address] Encounter: [id number]  Date Of Visit: 01/19/22    Problem List:    Principal Problem:    Pneumonia due to COVID-19 virus  Active Problems:    Acute respiratory failure with hypoxia ([facility] [address] )    Acute pulmonary embolism (HCC)    Sepsis ([facility] )      Assessment & Plan:    * Pneumonia due to COVID-19 virus  Assessment & Plan  Reports symptoms 1 week  Noted to be hypoxic on room air and now on 2 L  Will follow Mild disease pathway  · Dexamethasone 6 mg IV daily for 10 days  · Remdesivir 200 mg x 1 followed by 100 mg daily for 4 days  · Anticoagulation-weight based Lovenox b i d  as D-dimer noted to be significantly elevated  · Discussed with patient about Baricitinib and initiating it if oxygen requirement worsens  · Trend inflammatory markers, CRP  · Daily CBC, CMP  · Continue doxy and rocephin  procalcitonin is mildly elevated  · Patient was educated and encouraged self proning  · Increase activity and mobilization as tolerated    Results from last 7 days   Lab Units 01/18/22  1435   SARS-COV-2  Positive*     Results from last 7 days   Lab Units 01/19/22  0458 01/18/22  1435   CRP mg/L 133 9* 154 4*   D-DIMER QUANTITATIVE ug/ml FEU >20 00* >20 00*      Results from last 7 days   Lab Units 01/18/22  1435   PROCALCITONIN ng/ml 0 47*        Acute respiratory failure with hypoxia (HCC)  Assessment & Plan  As noted by hypoxia in the 70s on room air, tachypnea  Currently on 2 L   Titrate off oxygen as tolerated    Acute pulmonary embolism ([facility] )  Assessment & Plan  Patient noted to be hypoxic, tachycardic with significantly elevated D-dimer  CTA shows right upper lobe segmental and subsegmental PE with normal RV/LV ratio  Patient currently on weight based Lovenox and will transition to NOAC on discharge    Sepsis [facility])  Assessment & Plan  As noted by tachycardia, tachypnea most likely due to COVID virus, PE  Check blood cultures if febrile  Repeat labs in a m  Hypokalemia-resolved as of 2022  Assessment & Plan  Resolved with repletion  get repeat lab work in a m  Elevated serum creatinine-resolved as of 2022  Assessment & Plan  Likele pre renal in nature  No baseline available for comparison  Received timed Gentle IV fluids as patient also received dye for CTA   get repeat lab work in a m  Results from last 7 days   Lab Units 22  0458 22  1435   BUN mg/dL 21 24   CREATININE mg/dL 1 05 1 38*           VTE Pharmacologic Prophylaxis:   Lovenox    Patient Centered Rounds: I performed bedside rounds with nursing staff today  Discussions with Specialists or Other Care Team Provider: yes    Education and Discussions with Family / Patient: Updated  (wife) via phone  Time Spent for Care: 40 min  More than 50% of total time spent on counseling and coordination of care as described above  Current Length of Stay: 1 day(s)  Current Patient Status: Inpatient   Certification Statement: The patient will continue to require additional inpatient hospital stay due to Acute respiratory failure with hypoxia due to COVID, PE  Discharge Plan: Anticipate discharge in >72 hrs to home  Code Status: Level 3 - DNAR and DNI    Subjective:     Shortness of breath slightly improved    Objective:     Vitals:   Temp (24hrs), Av 1 °F (36 7 °C), Min:97 5 °F (36 4 °C), Max:98 4 °F (36 9 °C)    Temp:  [97 5 °F (36 4 °C)-98 4 °F (36 9 °C)] 98 4 °F (36 9 °C)  HR:  [] 89  Resp:  [20-28] 22  BP: (134-152)/(78-84) 134/78  SpO2:  [74 %-93 %] 93 %  Body mass index is 31 9 kg/m²  Input and Output Summary (last 24 hours):      Intake/Output Summary (Last 24 hours) at 2022 0859  Last data filed at 2022 1837  Gross per 24 hour   Intake 50 ml   Output --   Net 50 ml       Physical Exam:   Physical Exam  Constitutional:       General: He is not in acute distress  Appearance: He is not diaphoretic  HENT:      Head: Normocephalic and atraumatic  Eyes:      General: No scleral icterus  Cardiovascular:      Rate and Rhythm: Normal rate and regular rhythm  Pulmonary:      Effort: Pulmonary effort is normal  No respiratory distress  Breath sounds: No wheezing or rales  Comments: Decreased breath sounds bilaterally  Abdominal:      General: Bowel sounds are normal  There is no distension  Palpations: Abdomen is soft  Tenderness: There is no abdominal tenderness  Musculoskeletal:      Right lower leg: No edema  Left lower leg: No edema  Neurological:      Mental Status: He is alert and oriented to person, place, and time  Additional Data:     Labs:  Results from last 7 days   Lab Units 01/19/22  0458 01/18/22  1435 01/18/22  1435   WBC Thousand/uL 5 01   < > 7 84   HEMOGLOBIN g/dL 12 4   < > 13 8   HEMATOCRIT % 38 4   < > 42 6   PLATELETS Thousands/uL 245   < > 252   NEUTROS PCT %  --   --  84*   LYMPHS PCT %  --   --  8*   MONOS PCT %  --   --  7   EOS PCT %  --   --  0    < > = values in this interval not displayed       Results from last 7 days   Lab Units 01/19/22  0458   SODIUM mmol/L 138   POTASSIUM mmol/L 4 1   CHLORIDE mmol/L 104   CO2 mmol/L 23   BUN mg/dL 21   CREATININE mg/dL 1 05   ANION GAP mmol/L 11   CALCIUM mg/dL 7 7*   ALBUMIN g/dL 2 0*   TOTAL BILIRUBIN mg/dL 0 51   ALK PHOS U/L 88   ALT U/L 18   AST U/L 29   GLUCOSE RANDOM mg/dL 123     Results from last 7 days   Lab Units 01/18/22  1435   INR  1 53*             Results from last 7 days   Lab Units 01/18/22  1435   PROCALCITONIN ng/ml 0 47*       Lines/Drains:  Invasive Devices  Report    Peripheral Intravenous Line            Peripheral IV 01/18/22 Right Antecubital <1 day                      Imaging: Reviewed radiology reports from this admission including: chest CT scan    Recent Cultures (last 7 days):         Last 24 Hours Medication List: Current Facility-Administered Medications   Medication Dose Route Frequency Provider Last Rate    cefTRIAXone  1,000 mg Intravenous Q24H Danisha Revankar, DO Stopped (01/18/22 1837)    dexamethasone  6 mg Intravenous Q24H Danisha Revankar, DO      doxycycline hyclate  100 mg Oral Q12H [address] Danisha Revankar, DO      enoxaparin  1 mg/kg Subcutaneous Q12H [address] Danisha Revankar, DO      nicotine  1 patch Transdermal Daily Danisha Revankar, DO      remdesivir  100 mg Intravenous Q24H Danisha Revdillonar, DO          Today, Patient Was Seen By: Hemant Pedro DO    ** Please Note: "This note has been constructed using a voice recognition system  Therefore there may be syntax, spelling, and/or grammatical errors   Please call if you have any questions  "**

## 2022-01-20 PROBLEM — E66.9 OBESITY: Status: ACTIVE | Noted: 2022-01-20

## 2022-01-20 LAB
ALBUMIN SERPL BCP-MCNC: 2.1 G/DL (ref 3.5–5)
ALP SERPL-CCNC: 80 U/L (ref 46–116)
ALT SERPL W P-5'-P-CCNC: 19 U/L (ref 12–78)
ANION GAP SERPL CALCULATED.3IONS-SCNC: 11 MMOL/L (ref 4–13)
AST SERPL W P-5'-P-CCNC: 24 U/L (ref 5–45)
ATRIAL RATE: 111 BPM
BILIRUB SERPL-MCNC: 0.49 MG/DL (ref 0.2–1)
BUN SERPL-MCNC: 24 MG/DL (ref 5–25)
CALCIUM ALBUM COR SERPL-MCNC: 9.4 MG/DL (ref 8.3–10.1)
CALCIUM SERPL-MCNC: 7.9 MG/DL (ref 8.3–10.1)
CHLORIDE SERPL-SCNC: 105 MMOL/L (ref 100–108)
CO2 SERPL-SCNC: 25 MMOL/L (ref 21–32)
CREAT SERPL-MCNC: 1.13 MG/DL (ref 0.6–1.3)
CRP SERPL QL: 80.4 MG/L
ERYTHROCYTE [DISTWIDTH] IN BLOOD BY AUTOMATED COUNT: 13 % (ref 11.6–15.1)
GFR SERPL CREATININE-BSD FRML MDRD: 71 ML/MIN/1.73SQ M
GLUCOSE SERPL-MCNC: 108 MG/DL (ref 65–140)
HCT VFR BLD AUTO: 38.4 % (ref 36.5–49.3)
HGB BLD-MCNC: 12.7 G/DL (ref 12–17)
MCH RBC QN AUTO: 29.1 PG (ref 26.8–34.3)
MCHC RBC AUTO-ENTMCNC: 33.1 G/DL (ref 31.4–37.4)
MCV RBC AUTO: 88 FL (ref 82–98)
P AXIS: 44 DEGREES
PLATELET # BLD AUTO: 297 THOUSANDS/UL (ref 149–390)
PMV BLD AUTO: 9.9 FL (ref 8.9–12.7)
POTASSIUM SERPL-SCNC: 4.1 MMOL/L (ref 3.5–5.3)
PR INTERVAL: 146 MS
PROCALCITONIN SERPL-MCNC: 0.36 NG/ML
PROT SERPL-MCNC: 6.6 G/DL (ref 6.4–8.2)
QRS AXIS: 14 DEGREES
QRSD INTERVAL: 70 MS
QT INTERVAL: 308 MS
QTC INTERVAL: 418 MS
RBC # BLD AUTO: 4.37 MILLION/UL (ref 3.88–5.62)
SODIUM SERPL-SCNC: 141 MMOL/L (ref 136–145)
T WAVE AXIS: 18 DEGREES
VENTRICULAR RATE: 111 BPM
WBC # BLD AUTO: 6.09 THOUSAND/UL (ref 4.31–10.16)

## 2022-01-20 PROCEDURE — 86140 C-REACTIVE PROTEIN: CPT | Performed by: FAMILY MEDICINE

## 2022-01-20 PROCEDURE — 84145 PROCALCITONIN (PCT): CPT | Performed by: FAMILY MEDICINE

## 2022-01-20 PROCEDURE — 93010 ELECTROCARDIOGRAM REPORT: CPT | Performed by: INTERNAL MEDICINE

## 2022-01-20 PROCEDURE — 80053 COMPREHEN METABOLIC PANEL: CPT | Performed by: FAMILY MEDICINE

## 2022-01-20 PROCEDURE — 85027 COMPLETE CBC AUTOMATED: CPT | Performed by: FAMILY MEDICINE

## 2022-01-20 PROCEDURE — 99232 SBSQ HOSP IP/OBS MODERATE 35: CPT | Performed by: INTERNAL MEDICINE

## 2022-01-20 RX ORDER — GUAIFENESIN/DEXTROMETHORPHAN 100-10MG/5
10 SYRUP ORAL EVERY 4 HOURS PRN
Status: DISCONTINUED | OUTPATIENT
Start: 2022-01-20 | End: 2022-01-21 | Stop reason: HOSPADM

## 2022-01-20 RX ORDER — BENZONATATE 100 MG/1
100 CAPSULE ORAL 3 TIMES DAILY
Status: DISCONTINUED | OUTPATIENT
Start: 2022-01-20 | End: 2022-01-21 | Stop reason: HOSPADM

## 2022-01-20 RX ORDER — ACETAMINOPHEN 325 MG/1
650 TABLET ORAL EVERY 6 HOURS PRN
Status: DISCONTINUED | OUTPATIENT
Start: 2022-01-20 | End: 2022-01-21 | Stop reason: HOSPADM

## 2022-01-20 RX ADMIN — DEXAMETHASONE SODIUM PHOSPHATE 6 MG: 4 INJECTION, SOLUTION INTRA-ARTICULAR; INTRALESIONAL; INTRAMUSCULAR; INTRAVENOUS; SOFT TISSUE at 18:27

## 2022-01-20 RX ADMIN — ENOXAPARIN SODIUM 100 MG: 100 INJECTION SUBCUTANEOUS at 20:30

## 2022-01-20 RX ADMIN — DOXYCYCLINE 100 MG: 100 CAPSULE ORAL at 20:30

## 2022-01-20 RX ADMIN — BENZONATATE 100 MG: 100 CAPSULE ORAL at 15:48

## 2022-01-20 RX ADMIN — CEFTRIAXONE 1000 MG: 1 INJECTION, SOLUTION INTRAVENOUS at 18:27

## 2022-01-20 RX ADMIN — REMDESIVIR 100 MG: 100 INJECTION, POWDER, LYOPHILIZED, FOR SOLUTION INTRAVENOUS at 19:27

## 2022-01-20 RX ADMIN — BENZONATATE 100 MG: 100 CAPSULE ORAL at 20:30

## 2022-01-20 RX ADMIN — DOXYCYCLINE 100 MG: 100 CAPSULE ORAL at 09:52

## 2022-01-20 RX ADMIN — ENOXAPARIN SODIUM 100 MG: 100 INJECTION SUBCUTANEOUS at 09:52

## 2022-01-20 RX ADMIN — BENZONATATE 100 MG: 100 CAPSULE ORAL at 09:52

## 2022-01-20 NOTE — PROGRESS NOTES
[facility] Progress Note  Patient: Sulma Sethi 60 y o  male   MRN: [id number]  PCP: No primary care provider on file  Unit/Bed#: 3 [facility] 68 367-01 Encounter: [id number]  Date Of Visit: 01/20/22    Problem List:    Principal Problem:    Pneumonia due to COVID-19 virus  Active Problems:    Acute respiratory failure with hypoxia ([facility] )    Acute pulmonary embolism (HCC)    Sepsis ([facility] )    Obesity      Assessment & Plan:    Acute pulmonary embolism ([facility] )  Assessment & Plan  Patient noted to be hypoxic, tachycardic with significantly elevated D-dimer  CTA shows right upper lobe segmental and subsegmental PE with normal RV/LV ratio  Cardiac marker negative  ProBNP less than 200  Tachycardia improved  Oxygen requirement stable  · Monitor oxygen requirement  · Patient currently on weight based Lovenox and will transition to NOAC on discharge, discussed anticoagulation options with patient  Patient considered NoACS, will discussed with case management regarding coverage      Acute respiratory failure with hypoxia ([facility] )  Assessment & Plan  As noted by hypoxia in the 70s on room air, tachypnea  Currently on 2 L  Titrate off oxygen as tolerated    * Pneumonia due to COVID-19 virus  Assessment & Plan  Reports symptoms 1 week  Noted to be hypoxic on room air and now on 2 L at rest  Continue treatment as per Mild disease pathway  · Dexamethasone 6 mg IV daily for 10 days  · Remdesivir 200 mg x 1 followed by 100 mg daily for 4 days  · Anticoagulation-weight based Lovenox b i d  For confirmed pulmonary embolism on CT  D-dimer noted to be significantly elevated  · Discussed with patient about Baricitinib and initiating it if oxygen requirement worsens  · Trend inflammatory markers, CRP  · Urine Legionella, pneumo antigen negative    · Daily CBC, CMP  · Continue doxy and rocephin  procalcitonin is mildly elevated, downtrending  · Patient was educated and encouraged self proning  · Increase activity and mobilization as tolerated  · Symptomatic treatment, monitor oxygenation with activity    Results from last 7 days   Lab Units 01/18/22  1435   SARS-COV-2  Positive*     Results from last 7 days   Lab Units 01/20/22  0508 01/19/22  0458 01/18/22  1435   CRP mg/L 80 4* 133 9* 154 4*   D-DIMER QUANTITATIVE ug/ml FEU  --  >20 00* >20 00*      Results from last 7 days   Lab Units 01/20/22  0508 01/18/22  1435   PROCALCITONIN ng/ml 0 36* 0 47*        Sepsis ([facility] )  Assessment & Plan  As noted by tachycardia, tachypnea most likely due to COVID virus, PE  Tachycardia and tachypnea improving  · Check blood cultures if febrile  · Repeat labs in a m   · Monitor clinically    Obesity  Assessment & Plan  With BMI of 31 9    Hypokalemia-resolved as of 1/19/2022  Assessment & Plan  Resolved with repletion  get repeat lab work in a m  Elevated serum creatinine-resolved as of 1/19/2022  Assessment & Plan  Likele pre renal in nature  No baseline available for comparison  Received timed Gentle IV fluids as patient also received dye for CTA   get repeat lab work in a m  Results from last 7 days   Lab Units 01/19/22  0458 01/18/22  1435   BUN mg/dL 21 24   CREATININE mg/dL 1 05 1 38*           VTE Pharmacologic Prophylaxis:   Moderate Risk (Score 3-4) - Pharmacological DVT Prophylaxis Ordered: enoxaparin (Lovenox)  Patient Centered Rounds: I performed bedside rounds with nursing staff today  Discussions with Specialists or Other Care Team Provider:  Yes    Education and Discussions with Family / Patient: Updated  (wife) via phone  Time Spent for Care: 45 minutes  More than 50% of total time spent on counseling and coordination of care as described above      Current Length of Stay: 2 day(s)  Current Patient Status: Inpatient   Certification Statement: The patient will continue to require additional inpatient hospital stay due to Hypoxia, COVID  Discharge Plan: Anticipate discharge in 24-48 hrs to home     Code Status: Level 3 - DNAR and DNI    Subjective:   Reports having cough and shortness of breath with activity  Overall feels that he is improving  Denies any pain  Denies any  or GI symptoms, appetite fair    Objective:     Vitals:   Temp (24hrs), Av 3 °F (36 8 °C), Min:98 1 °F (36 7 °C), Max:98 6 °F (37 °C)    Temp:  [98 1 °F (36 7 °C)-98 6 °F (37 °C)] 98 1 °F (36 7 °C)  HR:  [64-92] 64  Resp:  [15-20] 15  BP: (127-129)/(69-74) 128/74  SpO2:  [91 %-93 %] 93 % on 2 L at rest  Body mass index is 31 9 kg/m²  Input and Output Summary (last 24 hours): Intake/Output Summary (Last 24 hours) at 2022 5423  Last data filed at 2022 0501  Gross per 24 hour   Intake 120 ml   Output 400 ml   Net -280 ml       Physical Exam:   Physical Exam  Constitutional:       General: He is not in acute distress  Appearance: He is obese  HENT:      Head: Normocephalic and atraumatic  Cardiovascular:      Rate and Rhythm: Normal rate  Pulmonary:      Effort: Pulmonary effort is normal  No respiratory distress  Breath sounds: No wheezing, rhonchi or rales  Chest:      Chest wall: No tenderness  Abdominal:      General: Bowel sounds are normal  There is no distension  Palpations: Abdomen is soft  Tenderness: There is no abdominal tenderness  There is no guarding or rebound  Musculoskeletal:      Right lower leg: No edema  Left lower leg: No edema  Skin:     General: Skin is warm and dry  Findings: No rash  Neurological:      General: No focal deficit present  Mental Status: He is alert  Mental status is at baseline  Cranial Nerves: No cranial nerve deficit           Additional Data:     Labs:  Results from last 7 days   Lab Units 22  0508 22  0458 22  1435   WBC Thousand/uL 6 09   < > 7 84   HEMOGLOBIN g/dL 12 7   < > 13 8   HEMATOCRIT % 38 4   < > 42 6   PLATELETS Thousands/uL 297   < > 252   NEUTROS PCT %  --   --  84*   LYMPHS PCT %  -- --  8*   MONOS PCT %  --   --  7   EOS PCT %  --   --  0    < > = values in this interval not displayed  Results from last 7 days   Lab Units 01/20/22  0508   SODIUM mmol/L 141   POTASSIUM mmol/L 4 1   CHLORIDE mmol/L 105   CO2 mmol/L 25   BUN mg/dL 24   CREATININE mg/dL 1 13   ANION GAP mmol/L 11   CALCIUM mg/dL 7 9*   ALBUMIN g/dL 2 1*   TOTAL BILIRUBIN mg/dL 0 49   ALK PHOS U/L 80   ALT U/L 19   AST U/L 24   GLUCOSE RANDOM mg/dL 108     Results from last 7 days   Lab Units 01/18/22  1435   INR  1 53*             Results from last 7 days   Lab Units 01/20/22  0508 01/18/22  1435   PROCALCITONIN ng/ml 0 36* 0 47*       Lines/Drains:  Invasive Devices  Report    Peripheral Intravenous Line            Peripheral IV 01/18/22 Right Antecubital 1 day                      Imaging: Reviewed radiology reports from this admission including: chest CT scan    Recent Cultures (last 7 days):   Results from last 7 days   Lab Units 01/18/22  2125   LEGIONELLA URINARY ANTIGEN  Negative       Last 24 Hours Medication List:   Current Facility-Administered Medications   Medication Dose Route Frequency Provider Last Rate    cefTRIAXone  1,000 mg Intravenous Q24H Danisha Revankar, DO 1,000 mg (01/19/22 1735)    dexamethasone  6 mg Intravenous Q24H Danisha Revankar, DO      doxycycline hyclate  100 mg Oral Q12H [address] Danisha Revankar, DO      enoxaparin  1 mg/kg Subcutaneous Q12H [address] Danisha Revankar, DO      nicotine  1 patch Transdermal Daily Danisha Revankar, DO      remdesivir  100 mg Intravenous Q24H Danisha Revankar, DO          Today, Patient Was Seen By: Mariam Chinchilla MD    ** Please Note: "This note has been constructed using a voice recognition system  Therefore there may be syntax, spelling, and/or grammatical errors   Please call if you have any questions  "**

## 2022-01-20 NOTE — CASE MANAGEMENT
Case Management Progress Note    Patient name Radha Dowell  Location [address] [place] [address] [address] -* MRN [id number]  : 1965 Date 2022       LOS (days): 2  Geometric Mean LOS (GMLOS) (days):   Days to GMLOS:        OBJECTIVE:     Current admission status: Inpatient  Preferred Pharmacy:   [facility] [address] [place] [id number]  Phone: [phone] Fax: [phone]    Primary Care Provider: No primary care provider on file      Primary Insurance: BLUE CROSS  Secondary Insurance:     PROGRESS NOTE:      SW called [facility] and confirmed OOP cost of Eliquis is $10

## 2022-01-21 VITALS
DIASTOLIC BLOOD PRESSURE: 74 MMHG | WEIGHT: 216 LBS | RESPIRATION RATE: 16 BRPM | OXYGEN SATURATION: 88 % | BODY MASS INDEX: 31.99 KG/M2 | SYSTOLIC BLOOD PRESSURE: 142 MMHG | HEART RATE: 74 BPM | HEIGHT: 69 IN | TEMPERATURE: 98.1 F

## 2022-01-21 PROBLEM — A41.9 SEPSIS (HCC): Status: RESOLVED | Noted: 2022-01-18 | Resolved: 2022-01-21

## 2022-01-21 LAB
ALBUMIN SERPL BCP-MCNC: 2 G/DL (ref 3.5–5)
ALP SERPL-CCNC: 78 U/L (ref 46–116)
ALT SERPL W P-5'-P-CCNC: 21 U/L (ref 12–78)
ANION GAP SERPL CALCULATED.3IONS-SCNC: 10 MMOL/L (ref 4–13)
AST SERPL W P-5'-P-CCNC: 26 U/L (ref 5–45)
BILIRUB SERPL-MCNC: 0.45 MG/DL (ref 0.2–1)
BUN SERPL-MCNC: 22 MG/DL (ref 5–25)
CALCIUM ALBUM COR SERPL-MCNC: 9.6 MG/DL (ref 8.3–10.1)
CALCIUM SERPL-MCNC: 8 MG/DL (ref 8.3–10.1)
CHLORIDE SERPL-SCNC: 105 MMOL/L (ref 100–108)
CO2 SERPL-SCNC: 26 MMOL/L (ref 21–32)
CREAT SERPL-MCNC: 1.03 MG/DL (ref 0.6–1.3)
CRP SERPL QL: 48.1 MG/L
D DIMER PPP FEU-MCNC: 3.86 UG/ML FEU
DME PARACHUTE DELIVERY DATE ACTUAL: NORMAL
DME PARACHUTE DELIVERY DATE EXPECTED: NORMAL
DME PARACHUTE DELIVERY DATE REQUESTED: NORMAL
DME PARACHUTE ITEM DESCRIPTION: NORMAL
DME PARACHUTE ORDER STATUS: NORMAL
DME PARACHUTE SUPPLIER NAME: NORMAL
DME PARACHUTE SUPPLIER PHONE: NORMAL
ERYTHROCYTE [DISTWIDTH] IN BLOOD BY AUTOMATED COUNT: 13.1 % (ref 11.6–15.1)
GFR SERPL CREATININE-BSD FRML MDRD: 79 ML/MIN/1.73SQ M
GLUCOSE SERPL-MCNC: 110 MG/DL (ref 65–140)
HCT VFR BLD AUTO: 39.9 % (ref 36.5–49.3)
HGB BLD-MCNC: 12.5 G/DL (ref 12–17)
MCH RBC QN AUTO: 27.8 PG (ref 26.8–34.3)
MCHC RBC AUTO-ENTMCNC: 31.3 G/DL (ref 31.4–37.4)
MCV RBC AUTO: 89 FL (ref 82–98)
PLATELET # BLD AUTO: 280 THOUSANDS/UL (ref 149–390)
PMV BLD AUTO: 10 FL (ref 8.9–12.7)
POTASSIUM SERPL-SCNC: 4.5 MMOL/L (ref 3.5–5.3)
PROCALCITONIN SERPL-MCNC: 0.35 NG/ML
PROT SERPL-MCNC: 6.1 G/DL (ref 6.4–8.2)
RBC # BLD AUTO: 4.49 MILLION/UL (ref 3.88–5.62)
SODIUM SERPL-SCNC: 141 MMOL/L (ref 136–145)
WBC # BLD AUTO: 5.39 THOUSAND/UL (ref 4.31–10.16)

## 2022-01-21 PROCEDURE — 84145 PROCALCITONIN (PCT): CPT | Performed by: FAMILY MEDICINE

## 2022-01-21 PROCEDURE — 85027 COMPLETE CBC AUTOMATED: CPT | Performed by: INTERNAL MEDICINE

## 2022-01-21 PROCEDURE — 86140 C-REACTIVE PROTEIN: CPT | Performed by: INTERNAL MEDICINE

## 2022-01-21 PROCEDURE — 99239 HOSP IP/OBS DSCHRG MGMT >30: CPT | Performed by: INTERNAL MEDICINE

## 2022-01-21 PROCEDURE — 80053 COMPREHEN METABOLIC PANEL: CPT | Performed by: INTERNAL MEDICINE

## 2022-01-21 PROCEDURE — 85379 FIBRIN DEGRADATION QUANT: CPT | Performed by: INTERNAL MEDICINE

## 2022-01-21 RX ORDER — GUAIFENESIN/DEXTROMETHORPHAN 100-10MG/5
10 SYRUP ORAL EVERY 4 HOURS PRN
Refills: 0
Start: 2022-01-21 | End: 2022-03-17 | Stop reason: ALTCHOICE

## 2022-01-21 RX ORDER — DEXAMETHASONE 2 MG/1
TABLET ORAL
Qty: 15 TABLET | Refills: 0 | Status: SHIPPED | OUTPATIENT
Start: 2022-01-21 | End: 2022-03-17 | Stop reason: ALTCHOICE

## 2022-01-21 RX ORDER — DEXAMETHASONE SODIUM PHOSPHATE 4 MG/ML
6 INJECTION, SOLUTION INTRA-ARTICULAR; INTRALESIONAL; INTRAMUSCULAR; INTRAVENOUS; SOFT TISSUE ONCE
Status: COMPLETED | OUTPATIENT
Start: 2022-01-21 | End: 2022-01-21

## 2022-01-21 RX ORDER — BENZONATATE 100 MG/1
100 CAPSULE ORAL 3 TIMES DAILY
Qty: 20 CAPSULE | Refills: 0 | Status: SHIPPED | OUTPATIENT
Start: 2022-01-21 | End: 2022-03-17 | Stop reason: ALTCHOICE

## 2022-01-21 RX ORDER — CEFUROXIME AXETIL 500 MG/1
500 TABLET ORAL EVERY 12 HOURS SCHEDULED
Qty: 4 TABLET | Refills: 0 | Status: SHIPPED | OUTPATIENT
Start: 2022-01-21 | End: 2022-01-23

## 2022-01-21 RX ADMIN — ENOXAPARIN SODIUM 100 MG: 100 INJECTION SUBCUTANEOUS at 08:52

## 2022-01-21 RX ADMIN — DEXAMETHASONE SODIUM PHOSPHATE 6 MG: 4 INJECTION, SOLUTION INTRA-ARTICULAR; INTRALESIONAL; INTRAMUSCULAR; INTRAVENOUS; SOFT TISSUE at 12:52

## 2022-01-21 RX ADMIN — DOXYCYCLINE 100 MG: 100 CAPSULE ORAL at 08:52

## 2022-01-21 RX ADMIN — BENZONATATE 100 MG: 100 CAPSULE ORAL at 08:52

## 2022-01-21 NOTE — INCIDENTAL FINDINGS
The following findings require follow up:  Radiographic finding   Finding:  CT scan revealed findings suggestive of COVID-19 pneumonia  Also noted to have pulmonary emboli on the right side     Follow up required:  Yes, repeat CT scan   Follow up should be done within 2-3 month(s)    Please notify the following clinician to assist with the follow up:   PCP and pulmonary

## 2022-01-21 NOTE — RESPIRATORY THERAPY NOTE
Home Oxygen Qualifying Test       Patient name: Irma Galvez        : 1965   Date of Test:  2022  Diagnosis: Pneumonia Covid-19     Home Oxygen Test:    **Medicare Guidelines require item(s) 1-5 on all ambulatory patients or 1 and 2 on non-ambulatory patients  1   Baseline SPO2 on Room Air at rest 85 %  2   SPO2 during exercise on Room Air 88 %  During exercise monitor SpO2  If SPO2 increases >=89% with ambulation do not add supplemental             oxygen  If <= 88% on room air add O2 via NC and titrate patient  Patient must be ambulated with O2 and titrated to > 88% with exertion  3   SPO2 on Oxygen at rest 96 % 3 lpm     4   SPO2 during exercise on Oxygen  94% a liter flow of 3 lpm     5   Exercise performed:          PT walked in room back and forth approximately 192 feet  [x]  Supplemental Home Oxygen is indicated  []  Client does not qualify for home oxygen        Respiratory Additional Notes-   Emilie Paul, RT

## 2022-01-21 NOTE — DISCHARGE SUMMARY
Discharge Summary - [facility]    Patient Information: Servando Machado 60 y o  male MRN: [id number]  Unit/Bed#: [address] [facility] Encounter: [id number]    Discharging Physician / Practitioner: Clyde Maya MD  PCP: No primary care provider on file  Admission Date: 1/18/2022  Discharge Date: 01/21/22    Reason for Admission: Shortness of Breath (COUGH AND sob FOR SEVERAL DAYS  SENT BY CARE NOW FOR O2 SAT OF 77%  CHEST HEAVINESS)      Discharge Diagnoses:     Principal Problem:    Pneumonia due to COVID-19 virus  Active Problems:    Acute respiratory failure with hypoxia ([facility] )    Acute pulmonary embolism (HCC)    Obesity  Resolved Problems:    Sepsis ([facility] )    Elevated serum creatinine    Hypokalemia        [facility] ([facility] )  Assessment & Plan  Patient noted to be hypoxic, tachycardic with significantly elevated D-dimer  CTA shows right upper lobe segmental and subsegmental PE with normal RV/LV ratio  Cardiac marker negative  ProBNP less than 200  Tachycardia improved  Oxygen requirement stable  · Patient was treated with weight based Lovenox and will transition to NOAC on discharge, discussed anticoagulation options with patient  Patient prefers NoAcs coverage verified  · Patient will be discharged on Eliquis on discharge  · Follow-up with PCP and Pulmonary as outpatient    Acute respiratory failure with hypoxia ([facility] )  Assessment & Plan  As noted by hypoxia in the 70s on room air, tachypnea on admission  Secondary to COVID infection and pulmonary embolism  Now improved currently saturating adequately on 3 L at rest and with activity  Supplemental oxygen on discharge  Pulmonary referral on discharge    * Pneumonia due to COVID-19 virus  Assessment & Plan  Reports symptoms 1 week    Noted to be hypoxic on room air , noted to have significantly elevated D-dimer and CRP on admission  Continued on treatment as per Mild disease pathway with improvement in symptoms  · Received Dexamethasone 6 mg IV daily, will transition to dexamethasone taper on discharge  · Received Remdesivir 200 mg x 1 followed by 100 mg daily during hospitalization  · Anticoagulation-weight based Lovenox b i d  For confirmed pulmonary embolism on CT  D-dimer noted to be significantly elevated on admission  Now downtrending  Transition to Eliquis on discharge  Coverage verified       · Trend inflammatory markers, CRP-downtrending  · Urine Legionella, pneumo antigen negative  · Daily CBC, CMP-remains stable  · Continue doxy and rocephin  procalcitonin is mildly elevated, downtrending  Will transition to Ceftin to complete 5 days  · Patient was educated and encouraged self proning, incentive spirometry  · Educated about the activity and mobilization as tolerated  · Home oxygen evaluation was done  patient requires 3 L of supplemental oxygen on discharge at rest and with activity  · Continue symptomatic treatment  · Discussed regarding COVID treatment, follow-up and precautions after discharge  · Recommend to follow-up with PCP and Pulmonary after discharge  Results from last 7 days   Lab Units 01/18/22  1435   SARS-COV-2  Positive*     Results from last 7 days   Lab Units 01/21/22  0603 01/20/22  0508 01/19/22  0458 01/18/22  1435   CRP mg/L 48 1* 80 4* 133 9* 154 4*   D-DIMER QUANTITATIVE ug/ml FEU 3 86*  --  >20 00* >20 00*      Results from last 7 days   Lab Units 01/21/22  0603 01/20/22  0508 01/18/22  1435   PROCALCITONIN ng/ml 0 35* 0 36* 0 47*        Sepsis (HCC)-resolved as of 1/21/2022  Assessment & Plan  As noted by tachycardia, tachypnea most likely due to COVID pneumonia with possible superimposed bacterial infection, PE  Tachycardia and tachypnea improved  Remains afebrile  Normal white count  Procalcitonin downtrended      Obesity  Assessment & Plan  With BMI of 31 9    Hypokalemia-resolved as of 1/19/2022  Assessment & Plan  Resolved with repletion  get repeat lab work in a m      Elevated serum creatinine-resolved as of 1/19/2022  Assessment & Plan  Likele pre renal in nature  No baseline available for comparison  Received timed Gentle IV fluids as patient also received dye for CTA   get repeat lab work in a m  Results from last 7 days   Lab Units 01/19/22  0458 01/18/22  1435   BUN mg/dL 21 24   CREATININE mg/dL 1 05 1 38*       Consultations During Hospital Stay:  None    Procedures Performed:     · Home oxygen evaluation    Significant Findings:     · Refer to hospital course and above listed diagnosis related plan for details    Imaging while in hospital:    XR chest 1 view portable    Result Date: 1/18/2022  Narrative: CHEST INDICATION:   Hypoxia  Patient has confirmed COVID-19  COMPARISON:  None EXAM PERFORMED/VIEWS:  XR CHEST PORTABLE FINDINGS: Cardiomediastinal silhouette appears unremarkable  Bilateral, predominantly peripheral consolidations  No pleural effusion or pneumothorax  Osseous structures appear within normal limits for patient age  Impression: Multifocal pneumonia in this patient with confirmed COVID 19  Workstation performed: [id number]     CTA ED chest PE study    Result Date: 1/18/2022  Narrative: CTA - CHEST WITH IV CONTRAST - PULMONARY ANGIOGRAM INDICATION:   COVID, hypoxia, tachycardia  COMPARISON: None  TECHNIQUE: CTA examination of the chest was performed using angiographic technique according to a protocol specifically tailored to evaluate for pulmonary embolism  Axial, sagittal, and coronal 2D reformatted images were created from the source data and  submitted for interpretation  In addition, coronal 3D MIP postprocessing was performed on the acquisition scanner  Radiation dose length product (DLP) for this visit:  642 84 mGy-cm   This examination, like all CT scans performed in the [facility], was performed utilizing techniques to minimize radiation dose exposure, including the use of iterative  reconstruction and automated exposure control   IV Contrast:  85 mL of iohexol (OMNIPAQUE)  FINDINGS: PULMONARY ARTERIAL TREE:  Filling defects in the right upper lobe segmental and subsegmental branches consistent with pulmonary emboli  LUNGS:  Multifocal lung opacities consistent with multifocal pneumonia has the appearance of Covid 19 pneumonia  Small hiatal hernia  PLEURA:  Unremarkable  HEART/GREAT VESSELS:  Unremarkable for patient's age  MEDIASTINUM AND SAE:  Unremarkable  CHEST WALL AND LOWER NECK:   Unremarkable  VISUALIZED STRUCTURES IN THE UPPER ABDOMEN:  Unremarkable  OSSEOUS STRUCTURES:  No acute fracture or destructive osseous lesion  Impression: Multifocal pneumonia likely Covid 19 related  Right upper lobe segmental and subsegmental pulmonary emboli  Normal RV/LV ratio  I personally tiger this study with Francheska Waters on 1/18/2022 at 6:00 PM  Workstation performed: [id number]       Incidental Findings:   · Imaging as above    Test Results Pending at Discharge (will require follow up):   · As per After Visit Summary     Outpatient Tests Requested:  · CT chest in 2-3 months    Complications:  Refer to hospital course and above listed diagnosis related plan, if any    Hospital Course: As per HPI  Leighton Snow is a 60 y o  male patient who originally presented to the hospital on 1/18/2022 due to COVID symptoms for 1 week  Patient wife also was noted to be positive for COVID  Patient was noted to have exertional shortness of breath and hypoxia  Patient was subsequently admitted  Please see above list of diagnoses and related plan for additional information         Condition at Discharge: stable     Discharge Day Visit / Exam:     Subjective:  Overall feels better  Breathing is improving at rest and with activity  Less cough  Denies any chest pain dizziness or palpitation  Oxygen requirement is stable at rest and with activity    Vitals: Blood Pressure: 142/74 (01/21/22 0852)  Pulse: 74 (01/21/22 0852)  Temperature: 98 1 °F (36 7 °C) (01/21/22 5639)  Temp Source: Oral (01/21/22 6680)  Respirations: 16 (01/21/22 0852)  Height: 5' 9" (175 3 cm) (01/19/22 0100)  Weight - Scale: 98 kg (216 lb) (01/19/22 0100)  SpO2: (!) 88 % (01/21/22 0852) on room air, saturating in mid 90s on 3 L at rest and with activity    Exam:   Physical Exam  Constitutional:       General: He is not in acute distress  Appearance: He is obese  HENT:      Head: Normocephalic and atraumatic  Cardiovascular:      Rate and Rhythm: Normal rate  Pulmonary:      Effort: Pulmonary effort is normal  No respiratory distress  Breath sounds: No wheezing, rhonchi or rales  Comments: Diminished, clear  Chest:      Chest wall: No tenderness  Abdominal:      General: Bowel sounds are normal  There is no distension  Palpations: Abdomen is soft  Tenderness: There is no abdominal tenderness  There is no guarding or rebound  Musculoskeletal:      Right lower leg: No edema  Left lower leg: No edema  Skin:     General: Skin is warm and dry  Findings: No rash  Neurological:      General: No focal deficit present  Mental Status: He is alert and oriented to person, place, and time  Mental status is at baseline  Cranial Nerves: No cranial nerve deficit  Discharge instructions/Information to patient and family:(Discharge Medications and Follow up):   See after visit summary for information provided to patient and family  Provisions for Follow-Up Care:  See after visit summary for information related to follow-up care and any pertinent home health orders  Disposition: Home    Planned Readmission:  No     Discharge Statement:  I spent 45 minutes discharging the patient  This time was spent on the day of discharge  I had direct contact with the patient on the day of discharge   Greater than 50% of the total time was spent examining patient, answering all patient questions, arranging and discussing plan of care with patient as well as directly providing post-discharge instructions  Additional time then spent on discharge activities  Coordinated with Case Management  Discussed with wife also  regarding discharge and follow-up plan  Discharge Medications:  See after visit summary for reconciled discharge medications provided to patient and family  ** Please Note: "This note has been constructed using a voice recognition system  Therefore there may be syntax, spelling, and/or grammatical errors   Please call if you have any questions  "**

## 2022-01-21 NOTE — DISCHARGE INSTRUCTIONS
[address]    Your healthcare provider and/or public health staff have evaluated you and have determined that you do not need to remain in the hospital at this time  At this time you can be isolated at home where you will be monitored by staff from your local or state health department  You should carefully follow the prevention and isolation steps below until a healthcare provider or local or state health department says that you can return to your normal activities  Stay home except to get medical care    People who are mildly ill with COVID-19 are able to isolate at home during their illness  You should restrict activities outside your home, except for getting medical care  Do not go to work, school, or public areas  Avoid using public transportation, ride-sharing, or taxis  Separate yourself from other people and animals in your home    People: As much as possible, you should stay in a specific room and away from other people in your home  Also, you should use a separate bathroom, if available  Animals: You should restrict contact with pets and other animals while you are sick with COVID-19, just like you would around other people  Although there have not been reports of pets or other animals becoming sick with COVID-19, it is still recommended that people sick with COVID-19 limit contact with animals until more information is known about the virus  When possible, have another member of your household care for your animals while you are sick  If you are sick with COVID-19, avoid contact with your pet, including petting, snuggling, being kissed or licked, and sharing food  If you must care for your pet or be around animals while you are sick, wash your hands before and after you interact with pets and wear a facemask  See COVID-19 and Animals for more information      Call ahead before visiting your doctor    If you have a medical appointment, call the healthcare provider and tell them that you have or may have COVID-19  This will help the healthcare providers office take steps to keep other people from getting infected or exposed  Wear a facemask    You should wear a facemask when you are around other people (e g , sharing a room or vehicle) or pets and before you enter a healthcare providers office  If you are not able to wear a facemask (for example, because it causes trouble breathing), then people who live with you should not stay in the same room with you, or they should wear a facemask if they enter your room  Cover your coughs and sneezes    Cover your mouth and nose with a tissue when you cough or sneeze  Throw used tissues in a lined trash can  Immediately wash your hands with soap and water for at least 20 seconds or, if soap and water are not available, clean your hands with an alcohol-based hand  that contains at least 60% alcohol  Clean your hands often    Wash your hands often with soap and water for at least 20 seconds, especially after blowing your nose, coughing, or sneezing; going to the bathroom; and before eating or preparing food  If soap and water are not readily available, use an alcohol-based hand  with at least 60% alcohol, covering all surfaces of your hands and rubbing them together until they feel dry  Soap and water are the best option if hands are visibly dirty  Avoid touching your eyes, nose, and mouth with unwashed hands  Avoid sharing personal household items    You should not share dishes, drinking glasses, cups, eating utensils, towels, or bedding with other people or pets in your home  After using these items, they should be washed thoroughly with soap and water  Clean all high-touch surfaces everyday    High touch surfaces include counters, tabletops, doorknobs, bathroom fixtures, toilets, phones, keyboards, tablets, and bedside tables  Also, clean any surfaces that may have blood, stool, or body fluids on them   Use a household cleaning spray or wipe, according to the label instructions  Labels contain instructions for safe and effective use of the cleaning product including precautions you should take when applying the product, such as wearing gloves and making sure you have good ventilation during use of the product  Monitor your symptoms    Seek prompt medical attention if your illness is worsening (e g , difficulty breathing)  Before seeking care, call your healthcare provider and tell them that you have, or are being evaluated for, COVID-19  Put on a facemask before you enter the facility  These steps will help the healthcare providers office to keep other people in the office or waiting room from getting infected or exposed  Ask your healthcare provider to call the local or [facility] health department  Persons who are placed under active monitoring or facilitated self-monitoring should follow instructions provided by their local health department or occupational health professionals, as appropriate  If you have a medical emergency and need to call 911, notify the dispatch personnel that you have, or are being evaluated for COVID-19  If possible, put on a facemask before emergency medical services arrive      Discontinuing home isolation    Patients with confirmed COVID-19 should remain under home isolation precautions until the following conditions are met:   - They have had no fever for at least 24 hours (that is one full day of no fever without the use medicine that reduces fevers)  AND  - other symptoms have improved (for example, when their cough or shortness of breath have improved)  AND  - If had mild or moderate illness, at least 10 days have passed since their symptoms first appeared or if severe illness (needed oxygen) or immunosuppressed, at least 20 days have passed since symptoms first appeared  Patients with confirmed COVID-19 should also notify close contacts (including their workplace) and ask that they self-quarantine  Currently, close contact is defined as being within 6 feet for 15 minutes or more from the period 24 hours starting 48 hours before symptom onset to the time at which the patient went into isolation  Close contacts of patients diagnosed with COVID-19 should be instructed by the patient to self-quarantine for 14 days from the last time of their last contact with the patient  Source: [employer]      Discharge Anticoagulation Plan:     While hospitalized, patient was confirmed to have DVT/PE (or other reason for full anticoagulation)     Patient is not determined to be high risk of bleeding  Patient should continue on therapeutic systemic anticoagulation for 3 to 6 months  Recommend checking D-dimer prior to discontinuing systemic anticoagulation  Patient will need follow up with primary care provider (or specialist) within 72 hours of hospital discharge, and frequently thereafter to monitor for signs of worsening respiratory function, VTE and/or bleeding  Apixaban (By mouth)   Apixaban (a-PIX-a-ban)  Treats and prevents blood clots  This medicine is a blood thinner  Brand Name(s): Eliquis, Eliquis 30 Day DVT/PE Starter Pack   There may be other brand names for this medicine  When This Medicine Should Not Be Used: This medicine is not right for everyone  Do not use it if you had an allergic reaction to apixaban or you have active bleeding  How to Use This Medicine:   Tablet  · Your doctor will tell you how much medicine to use  Do not use more than directed  · If you are not able to swallow the tablets whole, they may be crushed and mixed in water, 5% dextrose in water (D5W), apple juice, or applesauce  The crushed tablets may be mixed with 60 mL of water or D5W dose and given through a nasogastric tube (NGT)  · This medicine should come with a Medication Guide   Ask your pharmacist for a copy if you do not have one  · Missed dose: Take a dose as soon as you remember  If it is almost time for your next dose, wait until then and take a regular dose  Do not take extra medicine to make up for a missed dose  · Store the medicine in a closed container at room temperature, away from heat, moisture, and direct light  Drugs and Foods to Avoid:   Ask your doctor or pharmacist before using any other medicine, including over-the-counter medicines, vitamins, and herbal products  · Some medicines can affect how apixaban works  Tell your doctor if you are using any of the following:   ? Carbamazepine, itraconazole, ketoconazole, phenytoin, rifampin, ritonavir, Donald's wort  ? Blood thinner (including clopidogrel, heparin, prasugrel, warfarin)  ? Medicine to treat depression  ? NSAID pain or arthritis medicine (including aspirin, celecoxib, diclofenac, ibuprofen, naproxen)  Warnings While Using This Medicine:   · Tell your doctor if you are pregnant or breastfeeding, or if you have kidney disease, liver disease, bleeding problems, antiphospholipid syndrome, or an artificial heart valve  · Do not stop using this medicine suddenly without asking your doctor  You might have a higher risk of stroke for a short time after you stop using this medicine  · This medicine increases your risk for bleeding that can become serious if not controlled  You may also bruise easily, and it may take longer than usual for bleeding to stop  · This medicine may increase your risk for a hematoma (blood clot) in your spine or back if you undergo an epidural or spinal puncture  This could lead to paralysis  Tell your doctor if you ever had spine problems or back surgery  · Tell any doctor or dentist who treats you that you are using this medicine  With your doctor's supervision, you may need to stop using this medicine several days before you have surgery or medical tests    · Your doctor will do lab tests at regular visits to check on the effects of this medicine  Keep all appointments  · Keep all medicine out of the reach of children  Never share your medicine with anyone  Possible Side Effects While Using This Medicine:   Call your doctor right away if you notice any of these side effects:  · Allergic reaction: Itching or hives, swelling in your face or hands, swelling or tingling in your mouth or throat, chest tightness, trouble breathing  · Change in how much or how often you urinate, red or pink urine  · Chest pain, trouble breathing  · Coughing up blood, vomiting blood or material that looks like coffee grounds  · Numbness, tingling, or muscle weakness in your legs or feet  · Red or black, tarry stools  · Unusual bleeding, bruising, or weakness  If you notice other side effects that you think are caused by this medicine, tell your doctor  Call your doctor for medical advice about side effects  You may report side effects to FDA at 5-709-FDA-6911    © Copyright [employer] 2021 Information is for End User's use only and may not be sold, redistributed or otherwise used for commercial purposes  The above information is an  only  It is not intended as medical advice for individual conditions or treatments  Talk to your doctor, nurse or pharmacist before following any medical regimen to see if it is safe and effective for you  Dexamethasone (By mouth)   Dexamethasone (dex-a-METH-a-sone)  Treats inflammation, severe allergies, flare-ups of ongoing illnesses, and many other medical problems  May also be used to decrease some symptoms of cancer, including multiple myeloma  This medicine is a corticosteroid  Brand Name(s): Decadron, DexPak, Dexabliss 11-Day Dose Pack, Dxevo, Hemady, HiDex, TaperDex, TaperDex 6-Day, TaperDex 7-Day, ZCORT 7-Day   There may be other brand names for this medicine  When This Medicine Should Not Be Used: This medicine is not right for everyone   Do not use it if you had an allergic reaction to dexamethasone, or if you have a fungal infection  How to Use This Medicine:   Liquid, Tablet  · Take your medicine as directed  Your dose may need to be changed several times to find what works best for you  If you are using this medicine for an ongoing illness, your dose may need to be changed occasionally  · It is best to take this medicine with food or milk  · Hemady: May be taken with or without food  · Oral liquid: Measure the oral liquid medicine with a marked measuring spoon, oral syringe, or medicine cup  · Missed dose:   ? If your schedule is one dose every other day and you cannot use the missed dose until late in the day, wait until the next morning to use your medicine  Then skip a day and go back to your regular schedule  ? If your schedule is one dose every day, use the missed dose as soon as you can  Then go back to your regular schedule  ? If your schedule is more than one dose every day, use the missed dose as soon as you can  If it is almost time for your next dose, use two doses at that time  Then go back to your regular schedule  · Store the medicine in a closed container at room temperature, away from heat, moisture, and direct light  Drugs and Foods to Avoid:   Ask your doctor or pharmacist before using any other medicine, including over-the-counter medicines, vitamins, and herbal products  · Some medicines can affect how dexamethasone works  Tell your doctor if you are using any of the following:  ? Aminoglutethimide, aprepitant, carbamazepine, carfilzomib, cholestyramine, cobicistat, cyclosporine, digitalis, digoxin, ephedrine, indinavir, indomethacin, lenalidomide, pancuronium, phenobarbital, phenytoin, pomalidomide, ritonavir, thalidomide  ? Birth control pills (including estrogen)  ? Blood thinner (including warfarin)  ? Diuretic (water pill)  ? Insulin and oral diabetes medicine  ?  Medicine to treat an infection (including amphotericin B, azithromycin, clarithromycin, erythromycin, isoniazid, itraconazole, ketoconazole, rifampin)  ? NSAID pain or arthritis medicine (including aspirin, celecoxib, diclofenac, naproxen)  ? Potassium supplement  · This medicine may interfere with vaccines  Ask your doctor before you get a flu shot or any other vaccines  Warnings While Using This Medicine:   · Using [facility] while you are pregnant can harm your unborn baby  Use an effective form of birth control to keep from getting pregnant during treatment and for 1 month after your last dose  If you think you have become pregnant while using the medicine, tell your doctor right away  · Do not breastfeed during treatment with Hemady and for 2 weeks after your last dose  · Tell your doctor if you are pregnant or breastfeeding, or if you have kidney disease, liver disease (including cirrhosis), adrenal gland problems, blood clots, diabetes, eye or vision problems, heart failure, high blood pressure, myasthenia gravis, osteoporosis, stomach or bowel problems (including ulcer, diverticulitis), thyroid problems, depression, mental health problems, or a recent heart attack  · This medicine may cause the following problems:  ? High blood pressure  ? Eye or vision problems (including cataracts or glaucoma), with long-term use  ? Increased risk for cancer (including Kaposi's sarcoma)  ? Adrenal gland problems  ? Stomach or bowel perforation (tear or hole)  ? Bone problems (including osteoporosis)  ? Muscle problems  ? Slow growth in children  ? Changes in mood or behavior  · This medicine could cause you to get infections more easily  Tell your doctor right away if you have any type of infection (including herpes eye infection, tuberculosis, or threadworm) or a recent exposure to chickenpox or measles  Tell your doctor if you have an infection that keeps coming back    · If you use this medicine for a long time, tell your doctor about any extra stress or anxiety in your life, including other health concerns and emotional stress  Your dose might need to be changed for a short time while you have extra stress  · Talk with your doctor before using this medicine if you plan to have children  Some men who is taking Hemady have become infertile (unable to have children)  · Do not stop using this medicine suddenly  Your doctor will need to slowly decrease your dose before you stop it completely  · Tell any doctor or dentist who treats you that you are using this medicine  This medicine may affect certain medical test results  · Your doctor will do lab tests at regular visits to check on the effects of this medicine  Keep all appointments  · Keep all medicine out of the reach of children  Never share your medicine with anyone  Possible Side Effects While Using This Medicine:   Call your doctor right away if you notice any of these side effects:  · Allergic reaction: Itching or hives, swelling in your face or hands, swelling or tingling in your mouth or throat, chest tightness, trouble breathing  · Blurred vision or other changes in vision, trouble seeing, eye pain  · Bone pain, decrease in height  · Chest pain, trouble breathing, coughing up blood  · Dark freckles, skin color changes, coldness, weakness, tiredness, weight loss  · Depression, unusual thoughts, feelings, or behaviors, trouble sleeping  · Fever, chills, cough, sore throat, body aches  · Rapid weight gain, swelling in your hands, ankles, or feet  · Severe stomach pain, nausea, vomiting, or red or black stools  · Skin changes or growths  · Unexplained muscle pain, tenderness, or weakness  · Unusual bleeding or bruising  If you notice these less serious side effects, talk with your doctor:   · Round, puffy face  · Weight gain around your neck, upper back, breast, face, or waist  If you notice other side effects that you think are caused by this medicine, tell your doctor  Call your doctor for medical advice about side effects   You may report side effects to FDA at 2-085-FDA-8390    © Copyright [employer] 2021 Information is for End User's use only and may not be sold, redistributed or otherwise used for commercial purposes  The above information is an  only  It is not intended as medical advice for individual conditions or treatments  Talk to your doctor, nurse or pharmacist before following any medical regimen to see if it is safe and effective for you  Antitussives (By mouth)   Treats dry coughs caused by colds, flu, and lung infections  Brand Name(s): Babee Cof Syrup, Children's Robitussin Cough Long Acting, Children's Triaminic Long Acting Cough, Cough DM, Creomulsion Cough, Creomulsion For Children, Delsym, Dextromethorphan Polistirex, EntexPAC, [facility], [facility] Cough DM, [facility] Cough DM, [facility] Cough DM ER, [facility] Cough Gels, [facility] Tussin   There may be other brand names for this medicine  When This Medicine Should Not Be Used: You should not use this medicine if you have had an allergic reaction to an antitussive (cough suppressants), or if you are also using an MAO inhibitor such as Nardil®, Parnate®, Eldepryl®, or Marplan® or you have used one within the past 2 weeks  Do not give any over-the-counter (OTC) cough and cold medicine to a baby or child under 3years old  Using these medicines in very young children might cause serious or possibly life-threatening side effects  How to Use This Medicine:   Liquid, Lozenge, Liquid Filled Capsule, Capsule, Tablet  · Your doctor will tell you how much medicine to use  Do not use more than directed  · Follow the instructions on the medicine label if you are using this medicine without a prescription  · Measure the oral liquid medicine with a marked measuring spoon, oral syringe, or medicine cup  You might need to shake the liquid before using it    · Swallow the liquid-filled capsule whole, such as Tessalon Perles® or Tessalon Capsules®  Do not chew, break, or suck on the liquid-filled capsule  If a dose is missed:   · Take a dose as soon as you remember  If it is almost time for your next dose, wait until then and take a regular dose  Do not take extra medicine to make up for a missed dose  How to Store and Dispose of This Medicine:   · Store at room temperature in a tightly closed container away from direct light, heat, and moisture  Do not freeze  · Keep all medicine out of the reach of children  Drugs and Foods to Avoid:   Ask your doctor or pharmacist before using any other medicine, including over-the-counter medicines, vitamins, and herbal products  · Do not drink alcohol while you are taking this medicine  · Make sure your doctor knows if you are taking sleeping pills, sedatives, cold and allergy medicines, muscle relaxants, tranquilizers, or pain pills before you take this medicine  Warnings While Using This Medicine:   · If you are pregnant or breastfeeding, talk to your doctor before taking this medicine  · Talk to your doctor if you have ever had asthma, emphysema, diabetes, heart disease, seizure disorders, an overactive thyroid, chronic bronchitis, or a liver disease before you take this medicine  · Call your doctor if your cough does not improve or last longer than 7 days  · Call your doctor if your cough gets worse, if you cough up yellow mucus, or if you have a fever, rash, sore throat, vomiting, or continuing headache with the cough    Possible Side Effects While Using This Medicine:   Call your doctor right away if you notice any of these side effects:  · Slowed or difficulty breathing  · Severe drowsiness  · Rash or itching  If you notice these less serious side effects, talk with your doctor:   · Drowsiness, dizziness  · Constipation  · Upset stomach  · Nervousness or restlessness  If you notice other side effects that you think are caused by this medicine, tell your doctor  Call your doctor for medical advice about side effects  You may report side effects to FDA at 5-730-FDA-3220    © Copyright [facility] 2021 Information is for End User's use only and may not be sold, redistributed or otherwise used for commercial purposes  The above information is an  only  It is not intended as medical advice for individual conditions or treatments  Talk to your doctor, nurse or pharmacist before following any medical regimen to see if it is safe and effective for you

## 2022-01-21 NOTE — PLAN OF CARE
Problem: PAIN - ADULT  Goal: Verbalizes/displays adequate comfort level or baseline comfort level  Description: Interventions:  - Encourage patient to monitor pain and request assistance  - Assess pain using appropriate pain scale  - Administer analgesics based on type and severity of pain and evaluate response  - Implement non-pharmacological measures as appropriate and evaluate response  - Consider cultural and social influences on pain and pain management  - Notify physician/advanced practitioner if interventions unsuccessful or patient reports new pain  Outcome: Progressing     Problem: Knowledge Deficit  Goal: Patient/family/caregiver demonstrates understanding of disease process, treatment plan, medications, and discharge instructions  Description: Complete learning assessment and assess knowledge base    Interventions:  - Provide teaching at level of understanding  - Provide teaching via preferred learning methods  Outcome: Progressing     Problem: RESPIRATORY - ADULT  Goal: Achieves optimal ventilation and oxygenation  Description: INTERVENTIONS:  - Assess for changes in respiratory status  - Assess for changes in mentation and behavior  - Position to facilitate oxygenation and minimize respiratory effort  - Oxygen administered by appropriate delivery if ordered  - Initiate smoking cessation education as indicated  - Encourage broncho-pulmonary hygiene including cough, deep breathe, Incentive Spirometry  - Assess the need for suctioning and aspirate as needed  - Assess and instruct to report SOB or any respiratory difficulty  - Respiratory Therapy support as indicated  Outcome: Progressing

## 2022-01-21 NOTE — DISCHARGE INSTR - OTHER ORDERS
Your goal pulse oximetry is 88 - 92%  If your pulse ox is <88 - rest for 5 minutes and take deep breaths through your nose with the oxygen in place  Make sure your finger is warm (cold fingers will give falsely low readings)  After 5 minutes, recheck your pulse ox  If your pulse ox continues to be below < 88% and you feel short of breath, rest, breathe through your nose and call your primary care physician or pulmonologist 24/7 (if after office hours the answering service will contact the on call physician who will call you back)  If you continue to feel excessively short of breath (much more than your normal breathing pattern), consider further evaluation in the emergency department - remember that a mask must be worn to enter any [facility] Emergency Department  If your pulse ox continues to be below < 88% and you do not feel short of breath increase your oxygen flow by 1 liter at a time to achieve a reading between 88 and 92%  If you are needing more than 2 liters higher than your normal flow for more than a few hours call your primary care physician or pulmonologist, even if you are not feeling short of breath  If your pulse ox is >92% it is safe to turn down oxygen by 1 liter at a time to achieve a reading of 88-92%

## 2022-01-21 NOTE — CASE MANAGEMENT
Case Management Discharge Planning Note    Patient name Betsy Marte  Location [address] [place] [address] [address] MRN [id number]  : 1965 Date 2022       Current Admission Date: 2022  Current Admission Diagnosis:Pneumonia due to COVID-19 virus   Patient Active Problem List    Diagnosis Date Noted    Obesity 2022    Acute pulmonary embolism ([facility] [address] [facility] ) 2022    Pneumonia due to COVID-19 virus 2022    Acute respiratory failure with hypoxia ([facility] [address] ) 2022      LOS (days): 3  Geometric Mean LOS (GMLOS) (days):   Days to GMLOS:     OBJECTIVE:  Risk of Unplanned Readmission Score: 11      Current admission status: Inpatient   Preferred Pharmacy:   [address] [place] [address],[address], [address] [place] [id number]  Phone: [phone] Fax: [phone]    Primary Care Provider: No primary care provider on file      Primary Insurance: BLUE CROSS  Secondary Insurance:     DISCHARGE DETAILS:    Discharge planning discussed with[de-identified] Patient, Spouse  Freedom of Choice: Yes  Comments - Freedom of Choice: Patient, Spouse has no preference for DME company  CM contacted family/caregiver?: Yes  Were Treatment Team discharge recommendations reviewed with patient/caregiver?: Yes  Did patient/caregiver verbalize understanding of patient care needs?: Yes  Were patient/caregiver advised of the risks associated with not following Treatment Team discharge recommendations?: Yes    Contacts  Patient Contacts: Redtanisha Clapper  Relationship to Patient[de-identified] Family  Contact Method: Phone  Phone Number: [phone]  Reason/Outcome: Continuity of [facility]         Is the patient interested in [facility] at discharge?: No    DME Referral Provided  Referral made for DME?: Yes  DME referral completed for the following items[de-identified] Home Oxygen concentrator,Portable Oxygen tanks  DME Supplier Name[de-identified] [employer]    Other Referral/Resources/Interventions Provided:  Interventions: DME    Would you like to participate in our [address] service program?  : No - Declined    Treatment Team Recommendation: Home  Discharge Destination Plan[de-identified] Home  Transport at Discharge : Family    [place] order submitted to [address] [place], [place] [address]brittney Whitlock for home O2  [employer] directed to contact spouse Casper Mcardle with delivery timeframe  Casper Mcardle called and made aware so she will answer phone  Once processed, portable tank will be delivered bedside  Brother to transport home  Unit nurse made aware       Contact info for Kari written on AVS

## 2022-01-21 NOTE — PLAN OF CARE
Problem: PAIN - ADULT  Goal: Verbalizes/displays adequate comfort level or baseline comfort level  Description: Interventions:  - Encourage patient to monitor pain and request assistance  - Assess pain using appropriate pain scale  - Administer analgesics based on type and severity of pain and evaluate response  - Implement non-pharmacological measures as appropriate and evaluate response  - Consider cultural and social influences on pain and pain management  - Notify physician/advanced practitioner if interventions unsuccessful or patient reports new pain  1/21/2022 1838 by Jorge Telles RN  Outcome: Adequate for Discharge     Problem: Knowledge Deficit  Goal: Patient/family/caregiver demonstrates understanding of disease process, treatment plan, medications, and discharge instructions  Description: Complete learning assessment and assess knowledge base    Interventions:  - Provide teaching at level of understanding  - Provide teaching via preferred learning methods  1/21/2022 1838 by Jorge Telles RN  Outcome: Adequate for Discharge     Problem: RESPIRATORY - ADULT  Goal: Achieves optimal ventilation and oxygenation  Description: INTERVENTIONS:  - Assess for changes in respiratory status  - Assess for changes in mentation and behavior  - Position to facilitate oxygenation and minimize respiratory effort  - Oxygen administered by appropriate delivery if ordered  - Initiate smoking cessation education as indicated  - Encourage broncho-pulmonary hygiene including cough, deep breathe, Incentive Spirometry  - Assess the need for suctioning and aspirate as needed  - Assess and instruct to report SOB or any respiratory difficulty  - Respiratory Therapy support as indicated  1/21/2022 1838 by Jorge Telles RN  Outcome: Adequate for Discharge

## 2022-01-21 NOTE — NURSING NOTE
Pt discharged from [address] [place] [address]  IV removed prior to discharge  Pt left with all their belongings  Pt left via wheelchair accompanied by PCA  Discharge instructions gone over with patient  No prescriptions written  Prescription sent over electronically to pt's pharmacy  All questions answered

## 2022-01-24 NOTE — UTILIZATION REVIEW
Notification of Discharge   This is a Notification of Discharge from our facility [address]  Please be advised that this patient has been discharge from our facility  Below you will find the admission and discharge date and time including the patients disposition  UTILIZATION REVIEW CONTACT:  Gian Hilton  Utilization   Network Utilization Review Department  Phone: [phone] x carefully listen to the prompts  All voicemails are confidential   Email: [email or web address]     PHYSICIAN ADVISORY SERVICES:  FOR [id number] REVIEW - MEDICAL NECESSITY DENIAL  Phone: [phone]  Fax: [phone]  Email: [email or web address]     PRESENTATION DATE: 1/18/2022  2:09 PM  OBERVATION ADMISSION DATE:   INPATIENT ADMISSION DATE: 1/18/22  4:37 PM   DISCHARGE DATE: 1/21/2022  4:32 PM  DISPOSITION: Home/Self Care Home/Self Care      IMPORTANT INFORMATION:  Send all requests for admission clinical reviews, approved or denied determinations and any other requests to dedicated fax number below belonging to the campus where the patient is receiving treatment   List of dedicated fax numbers:  [address] DENIALS (Administrative/Medical Necessity) [phone]   [address] (Maternity/NICU/Pediatrics) [phone]   Erwin Yeh [phone]   Particia Notice [phone]   Frank Proctor [phone]   [address] [phone]   [facility]  [phone]   [address] [place] [address], [place] campus  [facility] [address] [facility] [phone]

## 2022-03-17 ENCOUNTER — CONSULT (OUTPATIENT)
Dept: PULMONOLOGY | Facility: MEDICAL CENTER | Age: 57
End: 2022-03-17
Payer: COMMERCIAL

## 2022-03-17 VITALS
TEMPERATURE: 98.7 F | HEIGHT: 69 IN | BODY MASS INDEX: 32.88 KG/M2 | WEIGHT: 222 LBS | OXYGEN SATURATION: 96 % | HEART RATE: 116 BPM | DIASTOLIC BLOOD PRESSURE: 84 MMHG | SYSTOLIC BLOOD PRESSURE: 156 MMHG | RESPIRATION RATE: 16 BRPM

## 2022-03-17 DIAGNOSIS — U07.1 PNEUMONIA DUE TO COVID-19 VIRUS: ICD-10-CM

## 2022-03-17 DIAGNOSIS — U07.1 COVID: ICD-10-CM

## 2022-03-17 DIAGNOSIS — E66.09 CLASS 1 OBESITY DUE TO EXCESS CALORIES WITHOUT SERIOUS COMORBIDITY WITH BODY MASS INDEX (BMI) OF 32.0 TO 32.9 IN ADULT: ICD-10-CM

## 2022-03-17 DIAGNOSIS — J96.01 ACUTE RESPIRATORY FAILURE WITH HYPOXIA (HCC): ICD-10-CM

## 2022-03-17 DIAGNOSIS — I26.99 ACUTE PULMONARY EMBOLISM WITHOUT ACUTE COR PULMONALE, UNSPECIFIED PULMONARY EMBOLISM TYPE (HCC): Primary | ICD-10-CM

## 2022-03-17 DIAGNOSIS — J12.82 PNEUMONIA DUE TO COVID-19 VIRUS: ICD-10-CM

## 2022-03-17 PROCEDURE — 99204 OFFICE O/P NEW MOD 45 MIN: CPT | Performed by: INTERNAL MEDICINE

## 2022-03-17 NOTE — PATIENT INSTRUCTIONS
Stay on Eliquis and total end of April that can stop it    If you have any problems breathing afterwards contact our office    [facility]   [phone]

## 2022-03-17 NOTE — PROGRESS NOTES
Assessment/Plan:       Problem List Items Addressed This Visit        Respiratory    Pneumonia due to COVID-19 virus     Sahara Roy was hospitalized from 1/18 to 1/21/22 for acute hypoxemic respiratory failure due to COVID pneumonia  His hypoxemia was related to COVID pneumonia other than the pulmonary emboli he had in the right upper lobe  Did require oxygen for short time he was discharged from hospital but no longer needing it  He appears to have recovered well from this  He denies any residual shortness of breath or cough  He was not vaccinated prior to his admission         Acute respiratory failure with hypoxia [facility])     He did have acute hypoxemic respiratory failure due to his COVID pneumonia back in January of this year  He did require oxygen for few days after he was discharged home  Has not use did since then  Last time used it was in January  He is not have any shortness of breath his usual activity  He did have oxygen returned back to [facility]  He has had significant recovery in his oxygenation not needing it more  Pulse oximetry testing:  Room air O2 saturation at rest was 96% and after ambulating up and down a flight of stairs lowest O2 saturation was 90%  Quickly recovered back up to 96%    After walking up and down a flight of stairs today lowest O2 saturation not was 90%  He will likely have continued improvement in his oxygenation with activity  He does have pulse oximeter at home I did recommend a periodically check his O2 saturation why he is doing more physical exertion  Cardiovascular and Mediastinum    Acute pulmonary embolism ([address] ) - Primary     I reviewed CTA PE study of chest was done on 01/18/2021 during his admission for COVID-19 pneumonia hypoxemic respiratory failure  There are some medius eyes clots in branches of the right upper lobe pulmonary artery  Did not have any evidence of right heart strain  He has been on Eliquis since then and tolerating well    I recommended he be treated for total of 3 months  Has plenty of Eliquis left as he was given 3 month supply after he started to 1 month supply from local pharmacy  I told to continue Eliquis till the end of April which will complete just over 3 months of therapy  He will then stop the Eliquis  I told if he starts having any difficulty breathing when he stops Eliquis to notify our office  Other    Class 1 obesity due to excess calories without serious comorbidity with body mass index (BMI) of 32 0 to 32 9 in adult     Sue Fitzgerald is obese  He denies any loud snoring excessive daytime somnolence  I did talk to him about trying to lose weight           Other Visit Diagnoses     COVID                Plan for follow up:    Return if symptoms worsen or fail to improve  All questions are answered to the patient's satisfaction and understanding  He verbalizes understanding  He is encouraged to call with any further questions or concerns  Portions of the record may have been created with voice recognition software  Occasional wrong word or "sound a like" substitutions may have occurred due to the inherent limitations of voice recognition software  Read the chart carefully and recognize, using context, where substitutions have occurred  a    Electronically Signed by Quentin Carrera DO    ______________________________________________________________________    Chief Complaint:   Chief Complaint   Patient presents with   [facility]     [address]        Patient ID: Sue Fitzgerald is a 60 y o  y o  male has no past medical history on file  3/17/2022  Patient presents today for initial visit for post-COVID pneumonia follow up    HPI     Sue Fitzgerald was hospitalized at [address] [place] [address] from 1/18 to 1/21/22 for acute hypoxemic respiratory failure due to COVID pneumonia and also was diagnosed with pulmonary emboli and right upper lobe    Prior to that was having cough shortness of breath and some general malaise for several days  He had been on vaccinated for COVID prior to his admission  On day day presented ER he initially went to his primary practice and so [facility] nurse practitioner complaining of shortness of breath, fever and nonproductive cough for a few days prior  His room air O2 saturation office was 83% he was thus referred to ER  On presentation to ER his room air O2 saturation only 74% and temperature was 98 3°  Chest x-ray showed bilateral alveolar infiltrates with more peripheral predominance  Nasal swab was positive for COVID-19  C reactive protein was elevated at 154 and D-dimer was greater than 20  Some acute kidney injury with serum creatinine 1 38 but this improved to 1 03 by time of discharge  Also D-dimer decreased to 3 86 by time of discharge  His initial white blood cell count was 7 84 with hemoglobin of 13 8    CTA PE study of chest was done on 01/18/2022 and I reviewed images  This revealed a few thrombi and segmental subsegmental branches of right upper lobe  No evidence of any right heart strain  There were bilateral alveolar infiltrates with slightly peripheral pattern consistent with COVID-19 pneumonia  There is also small hiatal hernia  He was started on Eliquis which he has been taking since then  No melena or hematuria  He was discharged home with oxygen 3 liters/minutes on day of discharge his room air O2 saturation rest was 85% and was 88% with ambulation  With 3 liters/minute of oxygen his O2 saturation rest was 96% and with 3 liters/minute nasal cannula oxygen with ambulation was 94%  He used oxygen just for few days but then did not needed anymore  He does have pulse oximeter at home and has been monitor his O2 saturations  These have been greater than 90%    He is not having any shortness of breath with activity  No cough  He does not feel like he has any residual symptoms due to his COVID infection    Denies any history of excessive daytime somnolence or loud snoring  He denies any gastric reflux symptoms    [facility] [employer] did return to work and has been working about 6 hours per day  He return to work sometime in January  No nocturnal dyspnea  He denies any history of asthma or COPD  No history hypertension or kidney disease  No heart disease  He never smoked but he used to chew tobacco and stopped chewing tobacco when he was admitted to the hospital for the COVID pneumonia  He does work at a [employer]  Does a lot of paperwork but sometimes without the do some strenuous activity  Occupational/Exposure history:  Works at [address] [employer]   Constitutional: Negative for chills, fever and unexpected weight change  HENT: Negative for congestion, rhinorrhea and sore throat  Eyes: Negative for discharge and redness  Respiratory: Negative for cough and shortness of breath  Cardiovascular: Negative for chest pain, palpitations and leg swelling  Gastrointestinal: Negative for abdominal distention, abdominal pain and nausea  Endocrine: Negative for polydipsia and polyphagia  Genitourinary: Negative for dysuria  Musculoskeletal: Negative for joint swelling and myalgias  Skin: Negative for rash  Neurological: Negative for light-headedness  Psychiatric/Behavioral: Negative for confusion  Social history: He reports that he has never smoked  His smokeless tobacco use includes chew  He reports that he does not drink alcohol and does not use drugs  Past surgical history:   Past Surgical History:   Procedure Laterality Date    TONSILLECTOMY       Family history:   Family History   Problem Relation Age of Onset    Heart disease Mother     Heart disease Father          There is no immunization history on file for this patient    Current Outpatient Medications   Medication Sig Dispense Refill    apixaban (Eliquis) 5 mg Take 1 tablet (5 mg total) by mouth 2 (two) times a day 60 tablet 0    nicotine (NICODERM CQ) 7 mg/24hr TD 24 hr patch Place 1 patch on the skin daily (Patient not taking: Reported on 3/17/2022 ) 28 patch 0     No current facility-administered medications for this visit  Allergies: Patient has no known allergies  Objective:  Vitals:    03/17/22 0850   BP: 156/84   BP Location: Left arm   Patient Position: Sitting   Cuff Size: Large   Pulse: (!) 116   Resp: 16   Temp: 98 7 °F (37 1 °C)   TempSrc: Temporal   SpO2: 96%   Weight: 101 kg (222 lb)   Height: 5' 9" (1 753 m)   Oxygen Therapy  SpO2: 96 %    Wt Readings from Last 3 Encounters:   03/17/22 101 kg (222 lb)   01/19/22 98 kg (216 lb)   04/18/13 93 1 kg (205 lb 4 oz)     Body mass index is 32 78 kg/m²  Physical Exam  Vitals reviewed  Constitutional:       General: He is not in acute distress  Appearance: Normal appearance  He is well-developed  Comments: Patient is overweight  I did check pulse again at rest heart rate was 88   HENT:      Head: Normocephalic  Right Ear: External ear normal       Left Ear: External ear normal       Nose: Nose normal       Mouth/Throat:      Mouth: Mucous membranes are moist       Pharynx: Oropharynx is clear  No oropharyngeal exudate  Eyes:      Conjunctiva/sclera: Conjunctivae normal       Pupils: Pupils are equal, round, and reactive to light  Neck:      Vascular: No JVD  Trachea: No tracheal deviation  Cardiovascular:      Rate and Rhythm: Normal rate and regular rhythm  Heart sounds: Normal heart sounds  Pulmonary:      Effort: Pulmonary effort is normal       Comments: Lung sounds clear  No wheezes, crackles, or rhonchi  Abdominal:      General: There is no distension  Palpations: Abdomen is soft  Tenderness: There is no abdominal tenderness  There is no guarding  Musculoskeletal:      Cervical back: Neck supple  Comments: No edema, cyanosis or clubbing   Lymphadenopathy:      Cervical: No cervical adenopathy  Skin:     General: Skin is warm and dry        Findings: No rash  Neurological:      General: No focal deficit present  Mental Status: He is alert and oriented to person, place, and time  Psychiatric:         Behavior: Behavior normal          Thought Content: Thought content normal      Pulse oximetry testing:  Room air O2 saturation at rest was 96% and after ambulating up and down a flight of stairs lowest O2 saturation was 90%  Quickly recovered back up to 96%    Lab Review:   Lab Results   Component Value Date    K 4 5 01/21/2022     01/21/2022    CO2 26 01/21/2022    BUN 22 01/21/2022    CREATININE 1 03 01/21/2022    CALCIUM 8 0 (L) 01/21/2022     Lab Results   Component Value Date    WBC 5 39 01/21/2022    HGB 12 5 01/21/2022    HCT 39 9 01/21/2022    MCV 89 01/21/2022     01/21/2022       Diagnostics:  I have personally reviewed pertinent films in PACS  Chest x-ray:  Done 1/18/22 revealed moderate  bilateral alveolar infiltrates with peripheral predominance    CT of chest performed on 1/18/21 PE protocol revealed revealed few moderate size thrombi in branches of the right upper lobe pulmonary artery  There also scattered bilateral alveolar infiltrates consistent with COVID pneumonia

## 2022-03-17 NOTE — ASSESSMENT & PLAN NOTE
I reviewed CTA PE study of chest was done on 01/18/2021 during his admission for COVID-19 pneumonia hypoxemic respiratory failure  There are some medius eyes clots in branches of the right upper lobe pulmonary artery  Did not have any evidence of right heart strain  He has been on Eliquis since then and tolerating well  I recommended he be treated for total of 3 months  Has plenty of Eliquis left as he was given 3 month supply after he started to 1 month supply from local pharmacy  I told to continue Eliquis till the end of April which will complete just over 3 months of therapy  He will then stop the Eliquis  I told if he starts having any difficulty breathing when he stops Eliquis to notify our office

## 2022-03-17 NOTE — ASSESSMENT & PLAN NOTE
Josep Stanley is obese  He denies any loud snoring excessive daytime somnolence    I did talk to him about trying to lose weight

## 2022-03-17 NOTE — ASSESSMENT & PLAN NOTE
Guillermina Wetzel was hospitalized from 1/18 to 1/21/22 for acute hypoxemic respiratory failure due to COVID pneumonia  His hypoxemia was related to COVID pneumonia other than the pulmonary emboli he had in the right upper lobe  Did require oxygen for short time he was discharged from hospital but no longer needing it  He appears to have recovered well from this  He denies any residual shortness of breath or cough    He was not vaccinated prior to his admission

## 2022-03-17 NOTE — ASSESSMENT & PLAN NOTE
He did have acute hypoxemic respiratory failure due to his COVID pneumonia back in January of this year  He did require oxygen for few days after he was discharged home  Has not use did since then  Last time used it was in January  He is not have any shortness of breath his usual activity  He did have oxygen returned back to DME  He has had significant recovery in his oxygenation not needing it more  Pulse oximetry testing:  Room air O2 saturation at rest was 96% and after ambulating up and down a flight of stairs lowest O2 saturation was 90%  Quickly recovered back up to 96%    After walking up and down a flight of stairs today lowest O2 saturation not was 90%  He will likely have continued improvement in his oxygenation with activity  He does have pulse oximeter at home I did recommend a periodically check his O2 saturation why he is doing more physical exertion